# Patient Record
Sex: MALE | URBAN - METROPOLITAN AREA
[De-identification: names, ages, dates, MRNs, and addresses within clinical notes are randomized per-mention and may not be internally consistent; named-entity substitution may affect disease eponyms.]

---

## 2017-01-06 ENCOUNTER — HISTORICAL (OUTPATIENT)
Dept: LAB | Facility: HOSPITAL | Age: 52
End: 2017-01-06

## 2017-01-12 ENCOUNTER — HISTORICAL (OUTPATIENT)
Dept: RADIOLOGY | Facility: HOSPITAL | Age: 52
End: 2017-01-12

## 2017-02-06 ENCOUNTER — HISTORICAL (OUTPATIENT)
Dept: RADIOLOGY | Facility: HOSPITAL | Age: 52
End: 2017-02-06

## 2017-05-02 ENCOUNTER — HISTORICAL (OUTPATIENT)
Dept: LAB | Facility: HOSPITAL | Age: 52
End: 2017-05-02

## 2017-05-02 LAB
ABS NEUT (OLG): 5 X10(3)/MCL (ref 1.5–6.9)
ALBUMIN SERPL-MCNC: 4.2 GM/DL (ref 3.4–5)
ALBUMIN/GLOB SERPL: 1 RATIO
ALP SERPL-CCNC: 81 UNIT/L (ref 30–113)
ALT SERPL-CCNC: 22 UNIT/L (ref 10–45)
APPEARANCE, UA: CLEAR
AST SERPL-CCNC: 19 UNIT/L (ref 15–37)
BASOPHILS # BLD AUTO: 0 X10(3)/MCL (ref 0–0.1)
BASOPHILS NFR BLD AUTO: 0 % (ref 0–1)
BILIRUB SERPL-MCNC: 0.6 MG/DL (ref 0.1–0.9)
BILIRUB UR QL STRIP: NEGATIVE
BILIRUBIN DIRECT+TOT PNL SERPL-MCNC: 0.2 MG/DL (ref 0–0.3)
BILIRUBIN DIRECT+TOT PNL SERPL-MCNC: 0.4 MG/DL
BUN SERPL-MCNC: 7 MG/DL (ref 10–20)
CALCIUM SERPL-MCNC: 9 MG/DL (ref 8–10.5)
CHLORIDE SERPL-SCNC: 103 MMOL/L (ref 100–108)
CHOLEST SERPL-MCNC: 155 MG/DL (ref 140–200)
CHOLEST/HDLC SERPL: 3 MG/DL (ref 35–59)
CO2 SERPL-SCNC: 30 MMOL/L (ref 21–35)
COLOR UR: NORMAL
CREAT SERPL-MCNC: 0.61 MG/DL (ref 0.7–1.3)
DEPRECATED CALCIDIOL+CALCIFEROL SERPL-MC: 29.9 NG/ML (ref 30–80)
EOSINOPHIL # BLD AUTO: 0.2 X10(3)/MCL (ref 0–0.6)
EOSINOPHIL NFR BLD AUTO: 3 % (ref 0–5)
ERYTHROCYTE [DISTWIDTH] IN BLOOD BY AUTOMATED COUNT: 13.5 % (ref 11.5–17)
GLOBULIN SER-MCNC: 4 GM/DL
GLUCOSE (UA): NEGATIVE
GLUCOSE SERPL-MCNC: 90 MG/DL (ref 75–116)
HCT VFR BLD AUTO: 49.2 % (ref 42–52)
HDLC SERPL-MCNC: 49 MG/DL (ref 35–59)
HGB BLD-MCNC: 16.3 GM/DL (ref 14–18)
HGB UR QL STRIP: NEGATIVE
KETONES UR QL STRIP: NEGATIVE
LDLC SERPL CALC-MCNC: 95 MG/DL (ref 100–129)
LEUKOCYTE ESTERASE UR QL STRIP: NEGATIVE
LYMPHOCYTES # BLD AUTO: 2.7 X10(3)/MCL (ref 0.5–4.1)
LYMPHOCYTES NFR BLD AUTO: 30.6 % (ref 15–40)
MCH RBC QN AUTO: 32 PG (ref 27–34)
MCHC RBC AUTO-ENTMCNC: 33 GM/DL (ref 31–36)
MCV RBC AUTO: 97 FL (ref 80–99)
MONOCYTES # BLD AUTO: 0.8 X10(3)/MCL (ref 0–1.1)
MONOCYTES NFR BLD AUTO: 9 % (ref 4–12)
NEUTROPHILS # BLD AUTO: 5 X10(3)/MCL (ref 1.5–6.9)
NEUTROPHILS NFR BLD AUTO: 57 % (ref 43–75)
NITRITE UR QL STRIP: NEGATIVE
PH UR STRIP: 7 [PH]
PLATELET # BLD AUTO: 303 X10(3)/MCL (ref 140–400)
PMV BLD AUTO: 10.7 FL (ref 6.8–10)
POTASSIUM SERPL-SCNC: 4 MMOL/L (ref 3.6–5.2)
PROT SERPL-MCNC: 8.2 GM/DL (ref 6.4–8.2)
PROT UR QL STRIP: NEGATIVE
PSA SERPL-MCNC: 1.35 NG/ML (ref 0–4)
RBC # BLD AUTO: 5.07 X10(6)/MCL (ref 4.7–6.1)
SODIUM SERPL-SCNC: 140 MMOL/L (ref 135–145)
SP GR UR STRIP: 1.01
TRIGL SERPL-MCNC: 63 MG/DL (ref 35–150)
TSH SERPL-ACNC: 0.87 MIU/ML (ref 0.36–3.74)
UROBILINOGEN UR STRIP-ACNC: 0.2 EU/DL
VLDLC SERPL CALC-MCNC: 13 MG/DL
WBC # SPEC AUTO: 8.8 X10(3)/MCL (ref 4.5–11.5)

## 2017-05-31 ENCOUNTER — HISTORICAL (OUTPATIENT)
Dept: RADIOLOGY | Facility: HOSPITAL | Age: 52
End: 2017-05-31

## 2017-07-06 ENCOUNTER — HISTORICAL (OUTPATIENT)
Dept: WOUND CARE | Facility: HOSPITAL | Age: 52
End: 2017-07-06

## 2017-07-12 LAB — FINAL CULTURE: NORMAL

## 2017-07-13 ENCOUNTER — HISTORICAL (OUTPATIENT)
Dept: WOUND CARE | Facility: HOSPITAL | Age: 52
End: 2017-07-13

## 2017-07-17 ENCOUNTER — HISTORICAL (OUTPATIENT)
Dept: RADIOLOGY | Facility: HOSPITAL | Age: 52
End: 2017-07-17

## 2017-07-20 ENCOUNTER — HISTORICAL (OUTPATIENT)
Dept: WOUND CARE | Facility: HOSPITAL | Age: 52
End: 2017-07-20

## 2017-07-27 ENCOUNTER — HISTORICAL (OUTPATIENT)
Dept: WOUND CARE | Facility: HOSPITAL | Age: 52
End: 2017-07-27

## 2017-08-10 ENCOUNTER — HISTORICAL (OUTPATIENT)
Dept: WOUND CARE | Facility: HOSPITAL | Age: 52
End: 2017-08-10

## 2017-08-17 ENCOUNTER — HISTORICAL (OUTPATIENT)
Dept: WOUND CARE | Facility: HOSPITAL | Age: 52
End: 2017-08-17

## 2017-08-23 ENCOUNTER — HISTORICAL (OUTPATIENT)
Dept: RADIOLOGY | Facility: HOSPITAL | Age: 52
End: 2017-08-23

## 2017-08-24 ENCOUNTER — HISTORICAL (OUTPATIENT)
Dept: WOUND CARE | Facility: HOSPITAL | Age: 52
End: 2017-08-24

## 2017-08-28 ENCOUNTER — HISTORICAL (OUTPATIENT)
Dept: RADIOLOGY | Facility: HOSPITAL | Age: 52
End: 2017-08-28

## 2017-08-31 ENCOUNTER — HISTORICAL (OUTPATIENT)
Dept: WOUND CARE | Facility: HOSPITAL | Age: 52
End: 2017-08-31

## 2017-09-07 ENCOUNTER — HISTORICAL (OUTPATIENT)
Dept: WOUND CARE | Facility: HOSPITAL | Age: 52
End: 2017-09-07

## 2017-09-08 ENCOUNTER — HISTORICAL (OUTPATIENT)
Dept: LAB | Facility: HOSPITAL | Age: 52
End: 2017-09-08

## 2017-09-08 LAB
ABS NEUT (OLG): 0.6 X10(3)/MCL (ref 1.5–6.9)
ALBUMIN SERPL-MCNC: 4 GM/DL (ref 3.4–5)
ALBUMIN/GLOB SERPL: 1 RATIO
ALP SERPL-CCNC: 94 UNIT/L (ref 30–113)
ALT SERPL-CCNC: 17 UNIT/L (ref 10–45)
APPEARANCE, UA: CLEAR
AST SERPL-CCNC: 13 UNIT/L (ref 15–37)
BASOPHILS NFR BLD MANUAL: 0 % (ref 0–1)
BILIRUB SERPL-MCNC: 0.7 MG/DL (ref 0.1–0.9)
BILIRUB UR QL STRIP: NEGATIVE
BILIRUBIN DIRECT+TOT PNL SERPL-MCNC: 0.2 MG/DL (ref 0–0.3)
BILIRUBIN DIRECT+TOT PNL SERPL-MCNC: 0.5 MG/DL
BUN SERPL-MCNC: 4 MG/DL (ref 10–20)
CALCIUM SERPL-MCNC: 8.7 MG/DL (ref 8–10.5)
CHLORIDE SERPL-SCNC: 102 MMOL/L (ref 100–108)
CHOLEST SERPL-MCNC: 151 MG/DL (ref 140–200)
CHOLEST/HDLC SERPL: 5 MG/DL (ref 0–8)
CO2 SERPL-SCNC: 32 MMOL/L (ref 21–35)
COLOR UR: NORMAL
CREAT SERPL-MCNC: 0.7 MG/DL (ref 0.7–1.3)
CREAT UR-MCNC: 42.7 MG/DL
DEPRECATED CALCIDIOL+CALCIFEROL SERPL-MC: 18.98 NG/ML (ref 30–80)
EOSINOPHIL NFR BLD MANUAL: 11 % (ref 0–5)
ERYTHROCYTE [DISTWIDTH] IN BLOOD BY AUTOMATED COUNT: 13 % (ref 11.5–17)
GLOBULIN SER-MCNC: 4 GM/DL
GLUCOSE (UA): NEGATIVE
GLUCOSE SERPL-MCNC: 89 MG/DL (ref 75–116)
GRANULOCYTES NFR BLD MANUAL: 13 % (ref 47–80)
HCT VFR BLD AUTO: 44.3 % (ref 42–52)
HDLC SERPL-MCNC: 28 MG/DL (ref 35–59)
HGB BLD-MCNC: 14.8 GM/DL (ref 14–18)
HGB UR QL STRIP: NEGATIVE
KETONES UR QL STRIP: NEGATIVE
LDLC SERPL CALC-MCNC: 104 MG/DL (ref 100–129)
LEUKOCYTE ESTERASE UR QL STRIP: NEGATIVE
LYMPHOCYTES NFR BLD MANUAL: 49 % (ref 15–40)
MCH RBC QN AUTO: 31 PG (ref 27–34)
MCHC RBC AUTO-ENTMCNC: 33 GM/DL (ref 31–36)
MCV RBC AUTO: 93 FL (ref 80–99)
MICROALBUMIN UR-MCNC: 4.1 MG/DL (ref 0.1–2)
MICROALBUMIN/CREAT RATIO PNL UR: 96 MG/GM CR (ref 0–30)
MONOCYTES NFR BLD MANUAL: 27 % (ref 4–12)
NITRITE UR QL STRIP: NEGATIVE
PH UR STRIP: 7.5 [PH]
PLATELET # BLD AUTO: 342 X10(3)/MCL (ref 140–400)
PLATELET # BLD EST: ADEQUATE 10*3/UL
PMV BLD AUTO: 10.1 FL (ref 6.8–10)
POTASSIUM SERPL-SCNC: 3.8 MMOL/L (ref 3.6–5.2)
PROT SERPL-MCNC: 8 GM/DL (ref 6.4–8.2)
PROT UR QL STRIP: NEGATIVE
RBC # BLD AUTO: 4.75 X10(6)/MCL (ref 4.7–6.1)
RBC MORPH BLD: NORMAL
SODIUM SERPL-SCNC: 140 MMOL/L (ref 135–145)
SP GR UR STRIP: 1.01
T4 SERPL-MCNC: 8.7 MCG/DL (ref 5.3–11.5)
TRIGL SERPL-MCNC: 134 MG/DL (ref 35–150)
TSH SERPL-ACNC: 1.04 MIU/ML (ref 0.36–3.74)
UROBILINOGEN UR STRIP-ACNC: 0.2 EU/DL
VLDLC SERPL CALC-MCNC: 27 MG/DL
WBC # SPEC AUTO: 4.3 X10(3)/MCL (ref 4.5–11.5)

## 2017-09-14 ENCOUNTER — HISTORICAL (OUTPATIENT)
Dept: WOUND CARE | Facility: HOSPITAL | Age: 52
End: 2017-09-14

## 2017-09-20 ENCOUNTER — HISTORICAL (OUTPATIENT)
Dept: RADIOLOGY | Facility: HOSPITAL | Age: 52
End: 2017-09-20

## 2017-09-21 ENCOUNTER — HISTORICAL (OUTPATIENT)
Dept: WOUND CARE | Facility: HOSPITAL | Age: 52
End: 2017-09-21

## 2017-10-05 ENCOUNTER — HISTORICAL (OUTPATIENT)
Dept: WOUND CARE | Facility: HOSPITAL | Age: 52
End: 2017-10-05

## 2017-10-09 ENCOUNTER — HISTORICAL (OUTPATIENT)
Dept: WOUND CARE | Facility: HOSPITAL | Age: 52
End: 2017-10-09

## 2017-10-11 ENCOUNTER — HISTORICAL (OUTPATIENT)
Dept: SURGERY | Facility: HOSPITAL | Age: 52
End: 2017-10-11

## 2017-10-13 ENCOUNTER — HISTORICAL (OUTPATIENT)
Dept: ANESTHESIOLOGY | Facility: HOSPITAL | Age: 52
End: 2017-10-13

## 2017-10-19 ENCOUNTER — HISTORICAL (OUTPATIENT)
Dept: WOUND CARE | Facility: HOSPITAL | Age: 52
End: 2017-10-19

## 2017-10-23 ENCOUNTER — HOSPITAL ENCOUNTER (OUTPATIENT)
Dept: MEDSURG UNIT | Facility: HOSPITAL | Age: 52
End: 2017-10-26
Attending: SURGERY | Admitting: SURGERY

## 2017-10-23 LAB
ABS NEUT (OLG): 8.5 X10(3)/MCL (ref 1.5–6.9)
ALBUMIN SERPL-MCNC: 3.4 GM/DL (ref 3.4–5)
ALBUMIN/GLOB SERPL: 0.8 RATIO
ALP SERPL-CCNC: 96 UNIT/L (ref 30–113)
ALT SERPL-CCNC: 25 UNIT/L (ref 10–45)
ANISOCYTOSIS BLD QL SMEAR: ABNORMAL
AST SERPL-CCNC: 18 UNIT/L (ref 15–37)
BASOPHILS NFR BLD MANUAL: 0 % (ref 0–1)
BILIRUB SERPL-MCNC: 0.3 MG/DL (ref 0.1–0.9)
BILIRUBIN DIRECT+TOT PNL SERPL-MCNC: 0.1 MG/DL (ref 0–0.3)
BILIRUBIN DIRECT+TOT PNL SERPL-MCNC: 0.2 MG/DL
BUN SERPL-MCNC: 6 MG/DL (ref 10–20)
BUN SERPL-MCNC: 7 MG/DL (ref 10–20)
CALCIUM SERPL-MCNC: 8.9 MG/DL (ref 8–10.5)
CALCIUM SERPL-MCNC: 9 MG/DL (ref 8–10.5)
CHLORIDE SERPL-SCNC: 101 MMOL/L (ref 100–108)
CHLORIDE SERPL-SCNC: 102 MMOL/L (ref 100–108)
CO2 SERPL-SCNC: 30 MMOL/L (ref 21–35)
CO2 SERPL-SCNC: 31 MMOL/L (ref 21–35)
CREAT SERPL-MCNC: 0.76 MG/DL (ref 0.7–1.3)
CREAT SERPL-MCNC: 0.9 MG/DL (ref 0.7–1.3)
EOSINOPHIL NFR BLD MANUAL: 4 % (ref 0–5)
ERYTHROCYTE [DISTWIDTH] IN BLOOD BY AUTOMATED COUNT: 14.5 % (ref 11.5–17)
GLOBULIN SER-MCNC: 4.4 GM/DL
GLUCOSE SERPL-MCNC: 133 MG/DL (ref 75–116)
GLUCOSE SERPL-MCNC: 94 MG/DL (ref 75–116)
GRANULOCYTES NFR BLD MANUAL: 64 % (ref 47–80)
HCT VFR BLD AUTO: 43.5 % (ref 42–52)
HGB BLD-MCNC: 14.4 GM/DL (ref 14–18)
HYPOCHROMIA BLD QL SMEAR: ABNORMAL
LYMPHOCYTES NFR BLD MANUAL: 27 % (ref 15–40)
MCH RBC QN AUTO: 31 PG (ref 27–34)
MCHC RBC AUTO-ENTMCNC: 33 GM/DL (ref 31–36)
MCV RBC AUTO: 93 FL (ref 80–99)
MONOCYTES NFR BLD MANUAL: 5 % (ref 4–12)
PLATELET # BLD AUTO: 423 X10(3)/MCL (ref 140–400)
PLATELET # BLD EST: ADEQUATE 10*3/UL
PMV BLD AUTO: 9.4 FL (ref 6.8–10)
POTASSIUM SERPL-SCNC: 3.2 MMOL/L (ref 3.6–5.2)
POTASSIUM SERPL-SCNC: 3.4 MMOL/L (ref 3.6–5.2)
PROT SERPL-MCNC: 7.8 GM/DL (ref 6.4–8.2)
RBC # BLD AUTO: 4.68 X10(6)/MCL (ref 4.7–6.1)
RBC MORPH BLD: ABNORMAL
SODIUM SERPL-SCNC: 138 MMOL/L (ref 135–145)
SODIUM SERPL-SCNC: 140 MMOL/L (ref 135–145)
WBC # SPEC AUTO: 13.3 X10(3)/MCL (ref 4.5–11.5)

## 2017-10-24 LAB
ABS NEUT (OLG): 7.5 X10(3)/MCL (ref 1.5–6.9)
BASOPHILS # BLD AUTO: 0.1 X10(3)/MCL (ref 0–0.1)
BASOPHILS NFR BLD AUTO: 0 % (ref 0–1)
BUN SERPL-MCNC: 5 MG/DL (ref 10–20)
CALCIUM SERPL-MCNC: 8.8 MG/DL (ref 8–10.5)
CHLORIDE SERPL-SCNC: 100 MMOL/L (ref 100–108)
CO2 SERPL-SCNC: 31 MMOL/L (ref 21–35)
CREAT SERPL-MCNC: 0.86 MG/DL (ref 0.7–1.3)
EOSINOPHIL # BLD AUTO: 0.3 X10(3)/MCL (ref 0–0.6)
EOSINOPHIL NFR BLD AUTO: 2 % (ref 0–5)
ERYTHROCYTE [DISTWIDTH] IN BLOOD BY AUTOMATED COUNT: 14.4 % (ref 11.5–17)
GLUCOSE SERPL-MCNC: 107 MG/DL (ref 75–116)
HCT VFR BLD AUTO: 43.3 % (ref 42–52)
HGB BLD-MCNC: 14.2 GM/DL (ref 14–18)
LYMPHOCYTES # BLD AUTO: 2.5 X10(3)/MCL (ref 0.5–4.1)
LYMPHOCYTES NFR BLD AUTO: 22.4 % (ref 15–40)
MCH RBC QN AUTO: 30 PG (ref 27–34)
MCHC RBC AUTO-ENTMCNC: 33 GM/DL (ref 31–36)
MCV RBC AUTO: 93 FL (ref 80–99)
MONOCYTES # BLD AUTO: 0.9 X10(3)/MCL (ref 0–1.1)
MONOCYTES NFR BLD AUTO: 8 % (ref 4–12)
NEUTROPHILS # BLD AUTO: 7.5 X10(3)/MCL (ref 1.5–6.9)
NEUTROPHILS NFR BLD AUTO: 66 % (ref 43–75)
PLATELET # BLD AUTO: 414 X10(3)/MCL (ref 140–400)
PMV BLD AUTO: 9.3 FL (ref 6.8–10)
POTASSIUM SERPL-SCNC: 4 MMOL/L (ref 3.6–5.2)
RBC # BLD AUTO: 4.65 X10(6)/MCL (ref 4.7–6.1)
SODIUM SERPL-SCNC: 138 MMOL/L (ref 135–145)
WBC # SPEC AUTO: 11.3 X10(3)/MCL (ref 4.5–11.5)

## 2017-10-25 LAB
ABS NEUT (OLG): 7 X10(3)/MCL (ref 1.5–6.9)
ALBUMIN SERPL-MCNC: 3.3 GM/DL (ref 3.4–5)
ALBUMIN/GLOB SERPL: 0.8 RATIO
ALP SERPL-CCNC: 87 UNIT/L (ref 30–113)
ALT SERPL-CCNC: 21 UNIT/L (ref 10–45)
AST SERPL-CCNC: 16 UNIT/L (ref 15–37)
BASOPHILS # BLD AUTO: 0 X10(3)/MCL (ref 0–0.1)
BASOPHILS NFR BLD AUTO: 0 % (ref 0–1)
BILIRUB SERPL-MCNC: 0.5 MG/DL (ref 0.1–0.9)
BILIRUBIN DIRECT+TOT PNL SERPL-MCNC: 0.1 MG/DL (ref 0–0.3)
BILIRUBIN DIRECT+TOT PNL SERPL-MCNC: 0.4 MG/DL
BUN SERPL-MCNC: 7 MG/DL (ref 10–20)
CALCIUM SERPL-MCNC: 9.1 MG/DL (ref 8–10.5)
CHLORIDE SERPL-SCNC: 103 MMOL/L (ref 100–108)
CO2 SERPL-SCNC: 30 MMOL/L (ref 21–35)
CREAT SERPL-MCNC: 0.74 MG/DL (ref 0.7–1.3)
EOSINOPHIL # BLD AUTO: 0.3 X10(3)/MCL (ref 0–0.6)
EOSINOPHIL NFR BLD AUTO: 3 % (ref 0–5)
ERYTHROCYTE [DISTWIDTH] IN BLOOD BY AUTOMATED COUNT: 14.4 % (ref 11.5–17)
GLOBULIN SER-MCNC: 4.4 GM/DL
GLUCOSE SERPL-MCNC: 108 MG/DL (ref 75–116)
HCT VFR BLD AUTO: 44.6 % (ref 42–52)
HGB BLD-MCNC: 14.4 GM/DL (ref 14–18)
LYMPHOCYTES # BLD AUTO: 2.1 X10(3)/MCL (ref 0.5–4.1)
LYMPHOCYTES NFR BLD AUTO: 20.2 % (ref 15–40)
MAGNESIUM SERPL-MCNC: 2.4 MG/DL (ref 1.8–2.4)
MCH RBC QN AUTO: 30 PG (ref 27–34)
MCHC RBC AUTO-ENTMCNC: 32 GM/DL (ref 31–36)
MCV RBC AUTO: 94 FL (ref 80–99)
MONOCYTES # BLD AUTO: 0.8 X10(3)/MCL (ref 0–1.1)
MONOCYTES NFR BLD AUTO: 8 % (ref 4–12)
NEUTROPHILS # BLD AUTO: 7 X10(3)/MCL (ref 1.5–6.9)
NEUTROPHILS NFR BLD AUTO: 68 % (ref 43–75)
PHOSPHATE SERPL-MCNC: 3.9 MG/DL (ref 2.6–4.7)
PLATELET # BLD AUTO: 429 X10(3)/MCL (ref 140–400)
PMV BLD AUTO: 9.5 FL (ref 6.8–10)
POTASSIUM SERPL-SCNC: 4 MMOL/L (ref 3.6–5.2)
PROT SERPL-MCNC: 7.7 GM/DL (ref 6.4–8.2)
RBC # BLD AUTO: 4.76 X10(6)/MCL (ref 4.7–6.1)
SODIUM SERPL-SCNC: 140 MMOL/L (ref 135–145)
WBC # SPEC AUTO: 10.3 X10(3)/MCL (ref 4.5–11.5)

## 2017-10-26 ENCOUNTER — HISTORICAL (OUTPATIENT)
Dept: WOUND CARE | Facility: HOSPITAL | Age: 52
End: 2017-10-26

## 2017-10-26 LAB
ABS NEUT (OLG): 7.4 X10(3)/MCL (ref 1.5–6.9)
ALBUMIN SERPL-MCNC: 3.2 GM/DL (ref 3.4–5)
ALBUMIN/GLOB SERPL: 0.7 RATIO
ALP SERPL-CCNC: 77 UNIT/L (ref 30–113)
ALT SERPL-CCNC: 23 UNIT/L (ref 10–45)
AST SERPL-CCNC: 30 UNIT/L (ref 15–37)
BASOPHILS # BLD AUTO: 0 X10(3)/MCL (ref 0–0.1)
BASOPHILS NFR BLD AUTO: 0 % (ref 0–1)
BILIRUB SERPL-MCNC: 0.4 MG/DL (ref 0.1–0.9)
BILIRUBIN DIRECT+TOT PNL SERPL-MCNC: 0.1 MG/DL (ref 0–0.3)
BILIRUBIN DIRECT+TOT PNL SERPL-MCNC: 0.3 MG/DL
BUN SERPL-MCNC: 9 MG/DL (ref 10–20)
CALCIUM SERPL-MCNC: 9.3 MG/DL (ref 8–10.5)
CHLORIDE SERPL-SCNC: 102 MMOL/L (ref 100–108)
CO2 SERPL-SCNC: 30 MMOL/L (ref 21–35)
CREAT SERPL-MCNC: 0.69 MG/DL (ref 0.7–1.3)
EOSINOPHIL # BLD AUTO: 0.3 X10(3)/MCL (ref 0–0.6)
EOSINOPHIL NFR BLD AUTO: 3 % (ref 0–5)
ERYTHROCYTE [DISTWIDTH] IN BLOOD BY AUTOMATED COUNT: 14.7 % (ref 11.5–17)
GLOBULIN SER-MCNC: 4.4 GM/DL
GLUCOSE SERPL-MCNC: 100 MG/DL (ref 75–116)
HCT VFR BLD AUTO: 44 % (ref 42–52)
HGB BLD-MCNC: 14.1 GM/DL (ref 14–18)
LYMPHOCYTES # BLD AUTO: 2.1 X10(3)/MCL (ref 0.5–4.1)
LYMPHOCYTES NFR BLD AUTO: 19.4 % (ref 15–40)
MAGNESIUM SERPL-MCNC: 2.4 MG/DL (ref 1.8–2.4)
MCH RBC QN AUTO: 30 PG (ref 27–34)
MCHC RBC AUTO-ENTMCNC: 32 GM/DL (ref 31–36)
MCV RBC AUTO: 94 FL (ref 80–99)
MONOCYTES # BLD AUTO: 0.9 X10(3)/MCL (ref 0–1.1)
MONOCYTES NFR BLD AUTO: 8 % (ref 4–12)
NEUTROPHILS # BLD AUTO: 7.4 X10(3)/MCL (ref 1.5–6.9)
NEUTROPHILS NFR BLD AUTO: 69 % (ref 43–75)
PHOSPHATE SERPL-MCNC: 4.2 MG/DL (ref 2.6–4.7)
PLATELET # BLD AUTO: 448 X10(3)/MCL (ref 140–400)
PMV BLD AUTO: 9.3 FL (ref 6.8–10)
POTASSIUM SERPL-SCNC: 4.4 MMOL/L (ref 3.6–5.2)
PROT SERPL-MCNC: 7.6 GM/DL (ref 6.4–8.2)
RBC # BLD AUTO: 4.67 X10(6)/MCL (ref 4.7–6.1)
SODIUM SERPL-SCNC: 140 MMOL/L (ref 135–145)
WBC # SPEC AUTO: 10.8 X10(3)/MCL (ref 4.5–11.5)

## 2017-10-31 ENCOUNTER — HISTORICAL (OUTPATIENT)
Dept: WOUND CARE | Facility: HOSPITAL | Age: 52
End: 2017-10-31

## 2017-11-07 ENCOUNTER — HISTORICAL (OUTPATIENT)
Dept: WOUND CARE | Facility: HOSPITAL | Age: 52
End: 2017-11-07

## 2017-11-08 ENCOUNTER — HISTORICAL (OUTPATIENT)
Dept: INFUSION THERAPY | Facility: HOSPITAL | Age: 52
End: 2017-11-08

## 2017-11-08 LAB
ALBUMIN SERPL-MCNC: 4 GM/DL (ref 3.4–5)
ALBUMIN/GLOB SERPL: 0.9 RATIO
ALP SERPL-CCNC: 95 UNIT/L (ref 30–113)
ALT SERPL-CCNC: 18 UNIT/L (ref 10–45)
AST SERPL-CCNC: 18 UNIT/L (ref 15–37)
BILIRUB SERPL-MCNC: 0.4 MG/DL (ref 0.1–0.9)
BILIRUBIN DIRECT+TOT PNL SERPL-MCNC: 0.1 MG/DL (ref 0–0.3)
BILIRUBIN DIRECT+TOT PNL SERPL-MCNC: 0.3 MG/DL
BUN SERPL-MCNC: 6 MG/DL (ref 10–20)
CALCIUM SERPL-MCNC: 9.3 MG/DL (ref 8–10.5)
CHLORIDE SERPL-SCNC: 101 MMOL/L (ref 100–108)
CO2 SERPL-SCNC: 32 MMOL/L (ref 21–35)
CREAT SERPL-MCNC: 0.8 MG/DL (ref 0.7–1.3)
GLOBULIN SER-MCNC: 4.5 GM/DL
GLUCOSE SERPL-MCNC: 94 MG/DL (ref 75–116)
POTASSIUM SERPL-SCNC: 3.8 MMOL/L (ref 3.6–5.2)
PROT SERPL-MCNC: 8.5 GM/DL (ref 6.4–8.2)
SODIUM SERPL-SCNC: 138 MMOL/L (ref 135–145)

## 2017-11-16 ENCOUNTER — HISTORICAL (OUTPATIENT)
Dept: WOUND CARE | Facility: HOSPITAL | Age: 52
End: 2017-11-16

## 2017-11-29 ENCOUNTER — HISTORICAL (OUTPATIENT)
Dept: INFUSION THERAPY | Facility: HOSPITAL | Age: 52
End: 2017-11-29

## 2017-11-29 LAB
ABS NEUT (OLG): 5.8 X10(3)/MCL (ref 1.5–6.9)
BASOPHILS # BLD AUTO: 0 X10(3)/MCL (ref 0–0.1)
BASOPHILS NFR BLD AUTO: 0 % (ref 0–1)
EOSINOPHIL # BLD AUTO: 0.3 X10(3)/MCL (ref 0–0.6)
EOSINOPHIL NFR BLD AUTO: 3 % (ref 0–5)
ERYTHROCYTE [DISTWIDTH] IN BLOOD BY AUTOMATED COUNT: 15.4 % (ref 11.5–17)
HCT VFR BLD AUTO: 46.9 % (ref 42–52)
HGB BLD-MCNC: 15.8 GM/DL (ref 14–18)
LDH SERPL-CCNC: 150 UNIT/L (ref 105–241)
LYMPHOCYTES # BLD AUTO: 2.4 X10(3)/MCL (ref 0.5–4.1)
LYMPHOCYTES NFR BLD AUTO: 26.3 % (ref 15–40)
MCH RBC QN AUTO: 31 PG (ref 27–34)
MCHC RBC AUTO-ENTMCNC: 34 GM/DL (ref 31–36)
MCV RBC AUTO: 93 FL (ref 80–99)
MONOCYTES # BLD AUTO: 0.6 X10(3)/MCL (ref 0–1.1)
MONOCYTES NFR BLD AUTO: 6 % (ref 4–12)
NEUTROPHILS # BLD AUTO: 5.8 X10(3)/MCL (ref 1.5–6.9)
NEUTROPHILS NFR BLD AUTO: 64 % (ref 43–75)
PLATELET # BLD AUTO: 308 X10(3)/MCL (ref 140–400)
PMV BLD AUTO: 10 FL (ref 6.8–10)
PROT SERPL-MCNC: 7.8 GM/DL
RBC # BLD AUTO: 5.06 X10(6)/MCL (ref 4.7–6.1)
WBC # SPEC AUTO: 9.2 X10(3)/MCL (ref 4.5–11.5)

## 2017-12-13 ENCOUNTER — HISTORICAL (OUTPATIENT)
Dept: LAB | Facility: HOSPITAL | Age: 52
End: 2017-12-13

## 2017-12-13 LAB
ABS NEUT (OLG): 4.8 X10(3)/MCL (ref 1.5–6.9)
ALBUMIN SERPL-MCNC: 3.8 GM/DL (ref 3.4–5)
ALBUMIN/GLOB SERPL: 0.9 RATIO
ALP SERPL-CCNC: 88 UNIT/L (ref 30–113)
ALT SERPL-CCNC: 19 UNIT/L (ref 10–45)
APPEARANCE, UA: CLEAR
AST SERPL-CCNC: 17 UNIT/L (ref 15–37)
BASOPHILS # BLD AUTO: 0 X10(3)/MCL (ref 0–0.1)
BASOPHILS NFR BLD AUTO: 0 % (ref 0–1)
BILIRUB SERPL-MCNC: 0.5 MG/DL (ref 0.1–0.9)
BILIRUB UR QL STRIP: NEGATIVE
BILIRUBIN DIRECT+TOT PNL SERPL-MCNC: 0.1 MG/DL (ref 0–0.3)
BILIRUBIN DIRECT+TOT PNL SERPL-MCNC: 0.4 MG/DL
BUN SERPL-MCNC: 10 MG/DL (ref 10–20)
CALCIUM SERPL-MCNC: 8.7 MG/DL (ref 8–10.5)
CHLORIDE SERPL-SCNC: 104 MMOL/L (ref 100–108)
CHOLEST SERPL-MCNC: 141 MG/DL (ref 140–200)
CHOLEST/HDLC SERPL: 4 MG/DL (ref 0–8)
CO2 SERPL-SCNC: 27 MMOL/L (ref 21–35)
COLOR UR: NORMAL
CREAT SERPL-MCNC: 0.82 MG/DL (ref 0.7–1.3)
CREAT UR-MCNC: 24.8 MG/DL
DEPRECATED CALCIDIOL+CALCIFEROL SERPL-MC: 15.27 NG/ML (ref 30–80)
EOSINOPHIL # BLD AUTO: 0.5 X10(3)/MCL (ref 0–0.6)
EOSINOPHIL NFR BLD AUTO: 6 % (ref 0–5)
ERYTHROCYTE [DISTWIDTH] IN BLOOD BY AUTOMATED COUNT: 15.3 % (ref 11.5–17)
EST. AVERAGE GLUCOSE BLD GHB EST-MCNC: 100 MG/DL
GLOBULIN SER-MCNC: 4.3 GM/DL
GLUCOSE (UA): NEGATIVE
GLUCOSE SERPL-MCNC: 116 MG/DL (ref 75–116)
HBA1C MFR BLD: 5.1 % (ref 4.8–6)
HCT VFR BLD AUTO: 45.6 % (ref 42–52)
HDLC SERPL-MCNC: 35 MG/DL (ref 35–59)
HGB BLD-MCNC: 15.1 GM/DL (ref 14–18)
HGB UR QL STRIP: NEGATIVE
KETONES UR QL STRIP: NEGATIVE
LDLC SERPL CALC-MCNC: 89 MG/DL (ref 100–129)
LEUKOCYTE ESTERASE UR QL STRIP: NEGATIVE
LYMPHOCYTES # BLD AUTO: 2.4 X10(3)/MCL (ref 0.5–4.1)
LYMPHOCYTES NFR BLD AUTO: 28.4 % (ref 15–40)
MCH RBC QN AUTO: 31 PG (ref 27–34)
MCHC RBC AUTO-ENTMCNC: 33 GM/DL (ref 31–36)
MCV RBC AUTO: 95 FL (ref 80–99)
MICROALBUMIN UR-MCNC: 7.9 MG/DL (ref 0.1–2)
MICROALBUMIN/CREAT RATIO PNL UR: 318.2 MG/GM CR (ref 0–30)
MONOCYTES # BLD AUTO: 0.7 X10(3)/MCL (ref 0–1.1)
MONOCYTES NFR BLD AUTO: 8 % (ref 4–12)
NEUTROPHILS # BLD AUTO: 4.8 X10(3)/MCL (ref 1.5–6.9)
NEUTROPHILS NFR BLD AUTO: 56 % (ref 43–75)
NITRITE UR QL STRIP: NEGATIVE
PH UR STRIP: 7 [PH]
PLATELET # BLD AUTO: 349 X10(3)/MCL (ref 140–400)
PMV BLD AUTO: 10.3 FL (ref 6.8–10)
POTASSIUM SERPL-SCNC: 3.8 MMOL/L (ref 3.6–5.2)
PROT SERPL-MCNC: 8.1 GM/DL (ref 6.4–8.2)
PROT UR QL STRIP: NEGATIVE
PSA SERPL-MCNC: 1.25 NG/ML (ref 0–4)
RBC # BLD AUTO: 4.82 X10(6)/MCL (ref 4.7–6.1)
SODIUM SERPL-SCNC: 141 MMOL/L (ref 135–145)
SP GR UR STRIP: 1.01
T3FREE SERPL-MCNC: 2.99 PG/ML (ref 2.18–3.98)
T4 SERPL-MCNC: 5.6 MCG/DL (ref 5.3–11.5)
TESTOST SERPL-MCNC: 367.9 NG/DL (ref 241–827)
TRIGL SERPL-MCNC: 169 MG/DL (ref 35–150)
TSH SERPL-ACNC: 0.92 MIU/ML (ref 0.36–3.74)
UROBILINOGEN UR STRIP-ACNC: 0.2 EU/DL
VIT B12 SERPL-MCNC: 295 PG/ML (ref 193–986)
VLDLC SERPL CALC-MCNC: 34 MG/DL
WBC # SPEC AUTO: 8.6 X10(3)/MCL (ref 4.5–11.5)

## 2018-03-06 ENCOUNTER — HISTORICAL (OUTPATIENT)
Dept: LAB | Facility: HOSPITAL | Age: 53
End: 2018-03-06

## 2018-03-06 LAB
ABS NEUT (OLG): 5.1 X10(3)/MCL (ref 1.5–6.9)
ALBUMIN SERPL-MCNC: 3.7 GM/DL (ref 3.4–5)
ALBUMIN/GLOB SERPL: 1 RATIO
ALP SERPL-CCNC: 76 UNIT/L (ref 30–113)
ALT SERPL-CCNC: 18 UNIT/L (ref 10–45)
APPEARANCE, UA: CLEAR
AST SERPL-CCNC: 12 UNIT/L (ref 15–37)
BACTERIA SPEC CULT: NORMAL /HPF
BASOPHILS # BLD AUTO: 0 X10(3)/MCL (ref 0–0.1)
BASOPHILS NFR BLD AUTO: 0 % (ref 0–1)
BILIRUB SERPL-MCNC: 0.5 MG/DL (ref 0.1–0.9)
BILIRUB UR QL STRIP: NEGATIVE
BILIRUBIN DIRECT+TOT PNL SERPL-MCNC: 0.1 MG/DL (ref 0–0.3)
BILIRUBIN DIRECT+TOT PNL SERPL-MCNC: 0.4 MG/DL
BUN SERPL-MCNC: 6 MG/DL (ref 10–20)
CALCIUM SERPL-MCNC: 8.5 MG/DL (ref 8–10.5)
CHLORIDE SERPL-SCNC: 103 MMOL/L (ref 100–108)
CHOLEST SERPL-MCNC: 139 MG/DL (ref 140–200)
CHOLEST/HDLC SERPL: 4 MG/DL (ref 0–8)
CO2 SERPL-SCNC: 27 MMOL/L (ref 21–35)
COLOR UR: ABNORMAL
CREAT SERPL-MCNC: 0.74 MG/DL (ref 0.7–1.3)
DEPRECATED CALCIDIOL+CALCIFEROL SERPL-MC: 29.93 NG/ML (ref 30–80)
EOSINOPHIL # BLD AUTO: 0.4 X10(3)/MCL (ref 0–0.6)
EOSINOPHIL NFR BLD AUTO: 5 % (ref 0–5)
ERYTHROCYTE [DISTWIDTH] IN BLOOD BY AUTOMATED COUNT: 13.4 % (ref 11.5–17)
EST. AVERAGE GLUCOSE BLD GHB EST-MCNC: 100 MG/DL
GLOBULIN SER-MCNC: 3.8 GM/DL
GLUCOSE (UA): NEGATIVE
GLUCOSE SERPL-MCNC: 112 MG/DL (ref 75–116)
HBA1C MFR BLD: 5.1 % (ref 4.8–6)
HCT VFR BLD AUTO: 43.2 % (ref 42–52)
HDLC SERPL-MCNC: 32 MG/DL (ref 35–59)
HGB BLD-MCNC: 14.6 GM/DL (ref 14–18)
HGB UR QL STRIP: NEGATIVE
KETONES UR QL STRIP: NEGATIVE
LDH SERPL-CCNC: 129 UNIT/L (ref 105–241)
LDLC SERPL CALC-MCNC: 87 MG/DL (ref 100–129)
LEUKOCYTE ESTERASE UR QL STRIP: NEGATIVE
LYMPHOCYTES # BLD AUTO: 2.5 X10(3)/MCL (ref 0.5–4.1)
LYMPHOCYTES NFR BLD AUTO: 28.8 % (ref 15–40)
MCH RBC QN AUTO: 33 PG (ref 27–34)
MCHC RBC AUTO-ENTMCNC: 34 GM/DL (ref 31–36)
MCV RBC AUTO: 97 FL (ref 80–99)
MONOCYTES # BLD AUTO: 0.5 X10(3)/MCL (ref 0–1.1)
MONOCYTES NFR BLD AUTO: 6 % (ref 4–12)
NEUTROPHILS # BLD AUTO: 5.1 X10(3)/MCL (ref 1.5–6.9)
NEUTROPHILS NFR BLD AUTO: 60 % (ref 43–75)
NITRITE UR QL STRIP: NEGATIVE
PH UR STRIP: 6 [PH]
PLATELET # BLD AUTO: 271 X10(3)/MCL (ref 140–400)
PMV BLD AUTO: 9.9 FL (ref 6.8–10)
POTASSIUM SERPL-SCNC: 3.5 MMOL/L (ref 3.6–5.2)
PROT SERPL-MCNC: 7.5 GM/DL (ref 6.4–8.2)
PROT UR QL STRIP: ABNORMAL
RBC # BLD AUTO: 4.45 X10(6)/MCL (ref 4.7–6.1)
RBC #/AREA URNS HPF: NORMAL /HPF
SODIUM SERPL-SCNC: 139 MMOL/L (ref 135–145)
SP GR UR STRIP: <=1.005
SQUAMOUS EPITHELIAL, UA: NORMAL /LPF
TESTOST SERPL-MCNC: 441.4 NG/DL (ref 241–827)
TRIGL SERPL-MCNC: 158 MG/DL (ref 35–150)
UROBILINOGEN UR STRIP-ACNC: 0.2 EU/DL
VIT B12 SERPL-MCNC: 1867 PG/ML (ref 193–986)
VLDLC SERPL CALC-MCNC: 32 MG/DL
WBC # SPEC AUTO: 8.5 X10(3)/MCL (ref 4.5–11.5)
WBC #/AREA URNS HPF: NORMAL /[HPF]

## 2018-07-09 ENCOUNTER — HISTORICAL (OUTPATIENT)
Dept: LAB | Facility: HOSPITAL | Age: 53
End: 2018-07-09

## 2018-07-09 LAB
ABS NEUT (OLG): 4.3 X10(3)/MCL (ref 1.5–6.9)
ALBUMIN SERPL-MCNC: 3.6 GM/DL (ref 3.4–5)
ALBUMIN/GLOB SERPL: 0.9 RATIO
ALP SERPL-CCNC: 109 UNIT/L (ref 30–113)
ALT SERPL-CCNC: 36 UNIT/L (ref 10–45)
AST SERPL-CCNC: 31 UNIT/L (ref 15–37)
BASOPHILS # BLD AUTO: 0 X10(3)/MCL (ref 0–0.1)
BASOPHILS NFR BLD AUTO: 0 % (ref 0–1)
BILIRUB SERPL-MCNC: 0.5 MG/DL (ref 0.1–0.9)
BILIRUBIN DIRECT+TOT PNL SERPL-MCNC: 0.1 MG/DL (ref 0–0.3)
BILIRUBIN DIRECT+TOT PNL SERPL-MCNC: 0.4 MG/DL
BUN SERPL-MCNC: 11 MG/DL (ref 10–20)
CALCIUM SERPL-MCNC: 8.9 MG/DL (ref 8–10.5)
CHLORIDE SERPL-SCNC: 100 MMOL/L (ref 100–108)
CO2 SERPL-SCNC: 31 MMOL/L (ref 21–35)
CREAT SERPL-MCNC: 0.76 MG/DL (ref 0.7–1.3)
EOSINOPHIL # BLD AUTO: 0.3 X10(3)/MCL (ref 0–0.6)
EOSINOPHIL NFR BLD AUTO: 3 % (ref 0–5)
ERYTHROCYTE [DISTWIDTH] IN BLOOD BY AUTOMATED COUNT: 13.3 % (ref 11.5–17)
GLOBULIN SER-MCNC: 4.2 GM/DL
GLUCOSE SERPL-MCNC: 104 MG/DL (ref 75–116)
HCT VFR BLD AUTO: 46 % (ref 42–52)
HGB BLD-MCNC: 15.2 GM/DL (ref 14–18)
LDH SERPL-CCNC: 203 UNIT/L (ref 105–241)
LYMPHOCYTES # BLD AUTO: 3.1 X10(3)/MCL (ref 0.5–4.1)
LYMPHOCYTES NFR BLD AUTO: 36.6 % (ref 15–40)
MCH RBC QN AUTO: 31 PG (ref 27–34)
MCHC RBC AUTO-ENTMCNC: 33 GM/DL (ref 31–36)
MCV RBC AUTO: 95 FL (ref 80–99)
MONOCYTES # BLD AUTO: 0.7 X10(3)/MCL (ref 0–1.1)
MONOCYTES NFR BLD AUTO: 8 % (ref 4–12)
NEUTROPHILS # BLD AUTO: 4.3 X10(3)/MCL (ref 1.5–6.9)
NEUTROPHILS NFR BLD AUTO: 51 % (ref 43–75)
PLATELET # BLD AUTO: 363 X10(3)/MCL (ref 140–400)
PMV BLD AUTO: 10.4 FL (ref 6.8–10)
POTASSIUM SERPL-SCNC: 4 MMOL/L (ref 3.6–5.2)
PROT SERPL-MCNC: 7.8 GM/DL (ref 6.4–8.2)
RBC # BLD AUTO: 4.84 X10(6)/MCL (ref 4.7–6.1)
SODIUM SERPL-SCNC: 137 MMOL/L (ref 135–145)
WBC # SPEC AUTO: 8.4 X10(3)/MCL (ref 4.5–11.5)

## 2018-11-08 ENCOUNTER — HISTORICAL (OUTPATIENT)
Dept: LAB | Facility: HOSPITAL | Age: 53
End: 2018-11-08

## 2018-11-08 LAB
ABS NEUT (OLG): 4.7 X10(3)/MCL (ref 1.5–6.9)
ALBUMIN SERPL-MCNC: 4.2 GM/DL (ref 3.4–5)
ALBUMIN/GLOB SERPL: 1 RATIO
ALP SERPL-CCNC: 86 UNIT/L (ref 30–113)
ALT SERPL-CCNC: 28 UNIT/L (ref 10–45)
AST SERPL-CCNC: 17 UNIT/L (ref 15–37)
BASOPHILS # BLD AUTO: 0.1 X10(3)/MCL (ref 0–0.1)
BASOPHILS NFR BLD AUTO: 1 % (ref 0–1)
BILIRUB SERPL-MCNC: 0.8 MG/DL (ref 0.1–0.9)
BILIRUBIN DIRECT+TOT PNL SERPL-MCNC: 0.2 MG/DL (ref 0–0.3)
BILIRUBIN DIRECT+TOT PNL SERPL-MCNC: 0.6 MG/DL
BUN SERPL-MCNC: 8 MG/DL (ref 10–20)
CALCIUM SERPL-MCNC: 8.6 MG/DL (ref 8–10.5)
CHLORIDE SERPL-SCNC: 103 MMOL/L (ref 100–108)
CO2 SERPL-SCNC: 33 MMOL/L (ref 21–35)
CREAT SERPL-MCNC: 0.79 MG/DL (ref 0.7–1.3)
EOSINOPHIL # BLD AUTO: 0.2 X10(3)/MCL (ref 0–0.6)
EOSINOPHIL NFR BLD AUTO: 3 % (ref 0–5)
ERYTHROCYTE [DISTWIDTH] IN BLOOD BY AUTOMATED COUNT: 13.3 % (ref 11.5–17)
GLOBULIN SER-MCNC: 4.1 GM/DL
GLUCOSE SERPL-MCNC: 105 MG/DL (ref 75–116)
HCT VFR BLD AUTO: 50.5 % (ref 42–52)
HGB BLD-MCNC: 16.1 GM/DL (ref 14–18)
IMM GRANULOCYTES # BLD AUTO: 0.02 10*3/UL (ref 0–0.02)
IMM GRANULOCYTES NFR BLD AUTO: 0.2 % (ref 0–0.43)
LYMPHOCYTES # BLD AUTO: 2.8 X10(3)/MCL (ref 0.5–4.1)
LYMPHOCYTES NFR BLD AUTO: 34 % (ref 15–40)
MCH RBC QN AUTO: 32 PG (ref 27–34)
MCHC RBC AUTO-ENTMCNC: 32 GM/DL (ref 31–36)
MCV RBC AUTO: 100 FL (ref 80–99)
MONOCYTES # BLD AUTO: 0.5 X10(3)/MCL (ref 0–1.1)
MONOCYTES NFR BLD AUTO: 6 % (ref 4–12)
NEUTROPHILS # BLD AUTO: 4.7 X10(3)/MCL (ref 1.5–6.9)
NEUTROPHILS NFR BLD AUTO: 56 % (ref 43–75)
PLATELET # BLD AUTO: 309 X10(3)/MCL (ref 140–400)
PMV BLD AUTO: 10.5 FL (ref 6.8–10)
POTASSIUM SERPL-SCNC: 4.1 MMOL/L (ref 3.6–5.2)
PROT SERPL-MCNC: 8.3 GM/DL (ref 6.4–8.2)
RBC # BLD AUTO: 5.04 X10(6)/MCL (ref 4.7–6.1)
SODIUM SERPL-SCNC: 140 MMOL/L (ref 135–145)
WBC # SPEC AUTO: 8.4 X10(3)/MCL (ref 4.5–11.5)

## 2018-12-07 ENCOUNTER — HISTORICAL (OUTPATIENT)
Dept: LAB | Facility: HOSPITAL | Age: 53
End: 2018-12-07

## 2018-12-07 LAB
ABS NEUT (OLG): 5.1 X10(3)/MCL (ref 1.5–6.9)
ALBUMIN SERPL-MCNC: 3.8 GM/DL (ref 3.4–5)
ALBUMIN/GLOB SERPL: 1 RATIO
ALP SERPL-CCNC: 83 UNIT/L (ref 30–113)
ALT SERPL-CCNC: 24 UNIT/L (ref 10–45)
APPEARANCE, UA: CLEAR
AST SERPL-CCNC: 17 UNIT/L (ref 15–37)
BACTERIA SPEC CULT: NORMAL /HPF
BASOPHILS # BLD AUTO: 0 X10(3)/MCL (ref 0–0.1)
BASOPHILS NFR BLD AUTO: 0 % (ref 0–1)
BILIRUB SERPL-MCNC: 0.6 MG/DL (ref 0.1–0.9)
BILIRUB UR QL STRIP: NEGATIVE
BILIRUBIN DIRECT+TOT PNL SERPL-MCNC: 0.2 MG/DL (ref 0–0.3)
BILIRUBIN DIRECT+TOT PNL SERPL-MCNC: 0.4 MG/DL
BUN SERPL-MCNC: 5 MG/DL (ref 10–20)
CALCIUM SERPL-MCNC: 8.3 MG/DL (ref 8–10.5)
CHLORIDE SERPL-SCNC: 101 MMOL/L (ref 100–108)
CHOLEST SERPL-MCNC: 144 MG/DL (ref 140–200)
CHOLEST/HDLC SERPL: 4 MG/DL (ref 0–8)
CO2 SERPL-SCNC: 30 MMOL/L (ref 21–35)
COLOR UR: ABNORMAL
CREAT SERPL-MCNC: 0.7 MG/DL (ref 0.7–1.3)
CREAT UR-MCNC: 43.4 MG/DL
EOSINOPHIL # BLD AUTO: 0.2 X10(3)/MCL (ref 0–0.6)
EOSINOPHIL NFR BLD AUTO: 3 % (ref 0–5)
ERYTHROCYTE [DISTWIDTH] IN BLOOD BY AUTOMATED COUNT: 13.5 % (ref 11.5–17)
GLOBULIN SER-MCNC: 4 GM/DL
GLUCOSE (UA): NEGATIVE
GLUCOSE SERPL-MCNC: 89 MG/DL (ref 75–116)
HCT VFR BLD AUTO: 51.7 % (ref 42–52)
HDLC SERPL-MCNC: 35 MG/DL (ref 35–59)
HGB BLD-MCNC: 16.5 GM/DL (ref 14–18)
HGB UR QL STRIP: NEGATIVE
IMM GRANULOCYTES # BLD AUTO: 0.02 10*3/UL (ref 0–0.02)
IMM GRANULOCYTES NFR BLD AUTO: 0.2 % (ref 0–0.43)
KETONES UR QL STRIP: NEGATIVE
LDLC SERPL CALC-MCNC: 86 MG/DL (ref 100–129)
LEUKOCYTE ESTERASE UR QL STRIP: NEGATIVE
LYMPHOCYTES # BLD AUTO: 2.5 X10(3)/MCL (ref 0.5–4.1)
LYMPHOCYTES NFR BLD AUTO: 29 % (ref 15–40)
MCH RBC QN AUTO: 32 PG (ref 27–34)
MCHC RBC AUTO-ENTMCNC: 32 GM/DL (ref 31–36)
MCV RBC AUTO: 101 FL (ref 80–99)
MICROALBUMIN UR-MCNC: 17.6 MG/DL (ref 0.1–2)
MICROALBUMIN/CREAT RATIO PNL UR: 405.5 MG/GM CR (ref 0–30)
MONOCYTES # BLD AUTO: 0.6 X10(3)/MCL (ref 0–1.1)
MONOCYTES NFR BLD AUTO: 7 % (ref 4–12)
NEUTROPHILS # BLD AUTO: 5.1 X10(3)/MCL (ref 1.5–6.9)
NEUTROPHILS NFR BLD AUTO: 60 % (ref 43–75)
NITRITE UR QL STRIP: NEGATIVE
PH UR STRIP: 7 [PH]
PLATELET # BLD AUTO: 270 X10(3)/MCL (ref 140–400)
PMV BLD AUTO: 10.4 FL (ref 6.8–10)
POTASSIUM SERPL-SCNC: 3.7 MMOL/L (ref 3.6–5.2)
PROT SERPL-MCNC: 7.8 GM/DL (ref 6.4–8.2)
PROT UR QL STRIP: 30 MG/DL
PSA SERPL-MCNC: 1.01 NG/ML (ref 0–4)
RBC # BLD AUTO: 5.14 X10(6)/MCL (ref 4.7–6.1)
RBC #/AREA URNS HPF: NORMAL /HPF
SODIUM SERPL-SCNC: 140 MMOL/L (ref 135–145)
SP GR UR STRIP: 1.01
SQUAMOUS EPITHELIAL, UA: NORMAL /LPF
T3FREE SERPL-MCNC: 3.63 PG/ML (ref 2.18–3.98)
T4 SERPL-MCNC: 7.2 MCG/DL (ref 5.3–11.5)
TRIGL SERPL-MCNC: 163 MG/DL (ref 35–150)
TSH SERPL-ACNC: 0.74 MIU/ML (ref 0.36–3.74)
URATE SERPL-MCNC: 6 MG/DL (ref 2.6–7.2)
UROBILINOGEN UR STRIP-ACNC: 0.2 EU/DL
VLDLC SERPL CALC-MCNC: 33 MG/DL
WBC # SPEC AUTO: 8.5 X10(3)/MCL (ref 4.5–11.5)
WBC #/AREA URNS HPF: NORMAL /[HPF]

## 2019-01-22 ENCOUNTER — HISTORICAL (OUTPATIENT)
Dept: LAB | Facility: HOSPITAL | Age: 54
End: 2019-01-22

## 2019-01-22 LAB
ABS NEUT (OLG): 6 X10(3)/MCL (ref 1.5–6.9)
ALBUMIN SERPL-MCNC: 3.8 GM/DL (ref 3.4–5)
ALBUMIN/GLOB SERPL: 0.9 RATIO
ALP SERPL-CCNC: 94 UNIT/L (ref 30–113)
ALT SERPL-CCNC: 20 UNIT/L (ref 10–45)
AST SERPL-CCNC: 17 UNIT/L (ref 15–37)
BASOPHILS # BLD AUTO: 0.1 X10(3)/MCL (ref 0–0.1)
BASOPHILS NFR BLD AUTO: 1 % (ref 0–1)
BILIRUB SERPL-MCNC: 0.6 MG/DL (ref 0.1–0.9)
BILIRUBIN DIRECT+TOT PNL SERPL-MCNC: 0.1 MG/DL (ref 0–0.3)
BILIRUBIN DIRECT+TOT PNL SERPL-MCNC: 0.5 MG/DL
BUN SERPL-MCNC: 8 MG/DL (ref 10–20)
CALCIUM SERPL-MCNC: 8.8 MG/DL (ref 8–10.5)
CHLORIDE SERPL-SCNC: 102 MMOL/L (ref 100–108)
CO2 SERPL-SCNC: 32 MMOL/L (ref 21–35)
CREAT SERPL-MCNC: 0.73 MG/DL (ref 0.7–1.3)
EOSINOPHIL # BLD AUTO: 0.2 X10(3)/MCL (ref 0–0.6)
EOSINOPHIL NFR BLD AUTO: 2 % (ref 0–5)
ERYTHROCYTE [DISTWIDTH] IN BLOOD BY AUTOMATED COUNT: 13.7 % (ref 11.5–17)
GLOBULIN SER-MCNC: 4.2 GM/DL
GLUCOSE SERPL-MCNC: 108 MG/DL (ref 75–116)
HCT VFR BLD AUTO: 49.6 % (ref 42–52)
HGB BLD-MCNC: 15.9 GM/DL (ref 14–18)
IMM GRANULOCYTES # BLD AUTO: 0.05 10*3/UL (ref 0–0.02)
IMM GRANULOCYTES NFR BLD AUTO: 0.5 % (ref 0–0.43)
LYMPHOCYTES # BLD AUTO: 2.6 X10(3)/MCL (ref 0.5–4.1)
LYMPHOCYTES NFR BLD AUTO: 27 % (ref 15–40)
MCH RBC QN AUTO: 33 PG (ref 27–34)
MCHC RBC AUTO-ENTMCNC: 32 GM/DL (ref 31–36)
MCV RBC AUTO: 102 FL (ref 80–99)
MONOCYTES # BLD AUTO: 0.6 X10(3)/MCL (ref 0–1.1)
MONOCYTES NFR BLD AUTO: 6 % (ref 4–12)
NEUTROPHILS # BLD AUTO: 6 X10(3)/MCL (ref 1.5–6.9)
NEUTROPHILS NFR BLD AUTO: 62 % (ref 43–75)
PLATELET # BLD AUTO: 314 X10(3)/MCL (ref 140–400)
PMV BLD AUTO: 10.2 FL (ref 6.8–10)
POTASSIUM SERPL-SCNC: 4 MMOL/L (ref 3.6–5.2)
PROT SERPL-MCNC: 8 GM/DL (ref 6.4–8.2)
RBC # BLD AUTO: 4.87 X10(6)/MCL (ref 4.7–6.1)
SODIUM SERPL-SCNC: 141 MMOL/L (ref 135–145)
WBC # SPEC AUTO: 9.5 X10(3)/MCL (ref 4.5–11.5)

## 2019-01-25 ENCOUNTER — HISTORICAL (OUTPATIENT)
Dept: WOUND CARE | Facility: HOSPITAL | Age: 54
End: 2019-01-25

## 2019-02-01 ENCOUNTER — HISTORICAL (OUTPATIENT)
Dept: WOUND CARE | Facility: HOSPITAL | Age: 54
End: 2019-02-01

## 2019-02-15 ENCOUNTER — HISTORICAL (OUTPATIENT)
Dept: WOUND CARE | Facility: HOSPITAL | Age: 54
End: 2019-02-15

## 2019-03-01 ENCOUNTER — HISTORICAL (OUTPATIENT)
Dept: WOUND CARE | Facility: HOSPITAL | Age: 54
End: 2019-03-01

## 2019-03-21 ENCOUNTER — HISTORICAL (OUTPATIENT)
Dept: LAB | Facility: HOSPITAL | Age: 54
End: 2019-03-21

## 2019-03-21 LAB
ABS NEUT (OLG): 5.5 X10(3)/MCL (ref 1.5–6.9)
ALBUMIN SERPL-MCNC: 4 GM/DL (ref 3.4–5)
ALBUMIN/GLOB SERPL: 1.1 RATIO
ALP SERPL-CCNC: 87 UNIT/L (ref 30–113)
ALT SERPL-CCNC: 26 UNIT/L (ref 10–45)
AST SERPL-CCNC: 16 UNIT/L (ref 15–37)
BASOPHILS # BLD AUTO: 0 X10(3)/MCL (ref 0–0.1)
BASOPHILS NFR BLD AUTO: 0 % (ref 0–1)
BILIRUB SERPL-MCNC: 0.7 MG/DL (ref 0.1–0.9)
BILIRUBIN DIRECT+TOT PNL SERPL-MCNC: 0.2 MG/DL (ref 0–0.3)
BILIRUBIN DIRECT+TOT PNL SERPL-MCNC: 0.5 MG/DL
BUN SERPL-MCNC: 11 MG/DL (ref 10–20)
CALCIUM SERPL-MCNC: 8.5 MG/DL (ref 8–10.5)
CHLORIDE SERPL-SCNC: 102 MMOL/L (ref 100–108)
CO2 SERPL-SCNC: 28 MMOL/L (ref 21–35)
CREAT SERPL-MCNC: 0.75 MG/DL (ref 0.7–1.3)
EOSINOPHIL # BLD AUTO: 0.3 X10(3)/MCL (ref 0–0.6)
EOSINOPHIL NFR BLD AUTO: 3 % (ref 0–5)
ERYTHROCYTE [DISTWIDTH] IN BLOOD BY AUTOMATED COUNT: 13.5 % (ref 11.5–17)
GLOBULIN SER-MCNC: 3.6 GM/DL
GLUCOSE SERPL-MCNC: 92 MG/DL (ref 75–116)
HCT VFR BLD AUTO: 49 % (ref 42–52)
HGB BLD-MCNC: 15.7 GM/DL (ref 14–18)
IMM GRANULOCYTES # BLD AUTO: 0.04 10*3/UL (ref 0–0.02)
IMM GRANULOCYTES NFR BLD AUTO: 0.4 % (ref 0–0.43)
LYMPHOCYTES # BLD AUTO: 2.8 X10(3)/MCL (ref 0.5–4.1)
LYMPHOCYTES NFR BLD AUTO: 30 % (ref 15–40)
MCH RBC QN AUTO: 32 PG (ref 27–34)
MCHC RBC AUTO-ENTMCNC: 32 GM/DL (ref 31–36)
MCV RBC AUTO: 101 FL (ref 80–99)
MONOCYTES # BLD AUTO: 0.9 X10(3)/MCL (ref 0–1.1)
MONOCYTES NFR BLD AUTO: 9 % (ref 4–12)
NEUTROPHILS # BLD AUTO: 5.5 X10(3)/MCL (ref 1.5–6.9)
NEUTROPHILS NFR BLD AUTO: 58 % (ref 43–75)
PLATELET # BLD AUTO: 281 X10(3)/MCL (ref 140–400)
PMV BLD AUTO: 10.4 FL (ref 6.8–10)
POTASSIUM SERPL-SCNC: 3.6 MMOL/L (ref 3.6–5.2)
PROT SERPL-MCNC: 7.6 GM/DL (ref 6.4–8.2)
RBC # BLD AUTO: 4.85 X10(6)/MCL (ref 4.7–6.1)
SODIUM SERPL-SCNC: 139 MMOL/L (ref 135–145)
WBC # SPEC AUTO: 9.6 X10(3)/MCL (ref 4.5–11.5)

## 2019-04-15 ENCOUNTER — HISTORICAL (OUTPATIENT)
Dept: LAB | Facility: HOSPITAL | Age: 54
End: 2019-04-15

## 2019-04-15 LAB
ABS NEUT (OLG): 3.9 X10(3)/MCL (ref 1.5–6.9)
ALBUMIN SERPL-MCNC: 3.9 GM/DL (ref 3.4–5)
ALBUMIN/GLOB SERPL: 1 RATIO
ALP SERPL-CCNC: 88 UNIT/L (ref 30–113)
ALT SERPL-CCNC: 26 UNIT/L (ref 10–45)
APPEARANCE, UA: CLEAR
AST SERPL-CCNC: 17 UNIT/L (ref 15–37)
BACTERIA SPEC CULT: NORMAL /HPF
BASOPHILS # BLD AUTO: 0 X10(3)/MCL (ref 0–0.1)
BASOPHILS NFR BLD AUTO: 0 % (ref 0–1)
BILIRUB SERPL-MCNC: 0.8 MG/DL (ref 0.1–0.9)
BILIRUB UR QL STRIP: NEGATIVE
BILIRUBIN DIRECT+TOT PNL SERPL-MCNC: 0.2 MG/DL (ref 0–0.3)
BILIRUBIN DIRECT+TOT PNL SERPL-MCNC: 0.6 MG/DL
BUN SERPL-MCNC: 9 MG/DL (ref 10–20)
CALCIUM SERPL-MCNC: 8.6 MG/DL (ref 8–10.5)
CHLORIDE SERPL-SCNC: 103 MMOL/L (ref 100–108)
CHOLEST SERPL-MCNC: 137 MG/DL (ref 140–200)
CHOLEST/HDLC SERPL: 4 MG/DL (ref 0–8)
CO2 SERPL-SCNC: 32 MMOL/L (ref 21–35)
COLOR UR: ABNORMAL
CREAT SERPL-MCNC: 0.75 MG/DL (ref 0.7–1.3)
DEPRECATED CALCIDIOL+CALCIFEROL SERPL-MC: 17.99 NG/ML (ref 30–80)
EOSINOPHIL # BLD AUTO: 0.2 X10(3)/MCL (ref 0–0.6)
EOSINOPHIL NFR BLD AUTO: 3 % (ref 0–5)
ERYTHROCYTE [DISTWIDTH] IN BLOOD BY AUTOMATED COUNT: 13.4 % (ref 11.5–17)
GLOBULIN SER-MCNC: 3.9 GM/DL
GLUCOSE (UA): NEGATIVE
GLUCOSE SERPL-MCNC: 117 MG/DL (ref 75–116)
HCT VFR BLD AUTO: 52.9 % (ref 42–52)
HDLC SERPL-MCNC: 32 MG/DL (ref 35–59)
HGB BLD-MCNC: 16.9 GM/DL (ref 14–18)
HGB UR QL STRIP: NEGATIVE
IMM GRANULOCYTES # BLD AUTO: 0.02 10*3/UL (ref 0–0.02)
IMM GRANULOCYTES NFR BLD AUTO: 0.3 % (ref 0–0.43)
KETONES UR QL STRIP: NEGATIVE
LDLC SERPL CALC-MCNC: 87 MG/DL (ref 100–129)
LEUKOCYTE ESTERASE UR QL STRIP: NEGATIVE
LYMPHOCYTES # BLD AUTO: 2.5 X10(3)/MCL (ref 0.5–4.1)
LYMPHOCYTES NFR BLD AUTO: 34 % (ref 15–40)
MCH RBC QN AUTO: 32 PG (ref 27–34)
MCHC RBC AUTO-ENTMCNC: 32 GM/DL (ref 31–36)
MCV RBC AUTO: 101 FL (ref 80–99)
MONOCYTES # BLD AUTO: 0.6 X10(3)/MCL (ref 0–1.1)
MONOCYTES NFR BLD AUTO: 8 % (ref 4–12)
NEUTROPHILS # BLD AUTO: 3.9 X10(3)/MCL (ref 1.5–6.9)
NEUTROPHILS NFR BLD AUTO: 54 % (ref 43–75)
NITRITE UR QL STRIP: NEGATIVE
PH UR STRIP: 6.5 [PH]
PLATELET # BLD AUTO: 277 X10(3)/MCL (ref 140–400)
PMV BLD AUTO: 10.1 FL (ref 6.8–10)
POTASSIUM SERPL-SCNC: 4.1 MMOL/L (ref 3.6–5.2)
PROT SERPL-MCNC: 7.8 GM/DL (ref 6.4–8.2)
PROT UR QL STRIP: ABNORMAL
RBC # BLD AUTO: 5.24 X10(6)/MCL (ref 4.7–6.1)
RBC #/AREA URNS HPF: NORMAL /HPF
SODIUM SERPL-SCNC: 142 MMOL/L (ref 135–145)
SP GR UR STRIP: 1.01
SQUAMOUS EPITHELIAL, UA: NORMAL
T3FREE SERPL-MCNC: 3.22 PG/ML (ref 2.18–3.98)
T4 SERPL-MCNC: 7.6 MCG/DL (ref 5.3–11.5)
TRIGL SERPL-MCNC: 174 MG/DL (ref 35–150)
TSH SERPL-ACNC: 1.08 MIU/ML (ref 0.36–3.74)
UROBILINOGEN UR STRIP-ACNC: 0.2 EU/DL
VLDLC SERPL CALC-MCNC: 35 MG/DL
WBC # SPEC AUTO: 7.2 X10(3)/MCL (ref 4.5–11.5)
WBC #/AREA URNS HPF: NORMAL /[HPF]

## 2019-07-23 ENCOUNTER — HISTORICAL (OUTPATIENT)
Dept: LAB | Facility: HOSPITAL | Age: 54
End: 2019-07-23

## 2019-07-23 LAB
ABS NEUT (OLG): 6.2 X10(3)/MCL (ref 1.5–6.9)
ALBUMIN SERPL-MCNC: 3.8 GM/DL (ref 3.4–5)
ALBUMIN/GLOB SERPL: 1 RATIO
ALP SERPL-CCNC: 78 UNIT/L (ref 30–113)
ALT SERPL-CCNC: 25 UNIT/L (ref 10–45)
AST SERPL-CCNC: 17 UNIT/L (ref 15–37)
BASOPHILS # BLD AUTO: 0 X10(3)/MCL (ref 0–0.1)
BASOPHILS NFR BLD AUTO: 0 % (ref 0–1)
BILIRUB SERPL-MCNC: 1 MG/DL (ref 0.1–0.9)
BILIRUBIN DIRECT+TOT PNL SERPL-MCNC: 0.2 MG/DL (ref 0–0.3)
BILIRUBIN DIRECT+TOT PNL SERPL-MCNC: 0.8 MG/DL
BUN SERPL-MCNC: 12 MG/DL (ref 10–20)
CALCIUM SERPL-MCNC: 8.5 MG/DL (ref 8–10.5)
CHLORIDE SERPL-SCNC: 104 MMOL/L (ref 100–108)
CO2 SERPL-SCNC: 31 MMOL/L (ref 21–35)
CREAT SERPL-MCNC: 1.18 MG/DL (ref 0.7–1.3)
EOSINOPHIL # BLD AUTO: 0.3 X10(3)/MCL (ref 0–0.6)
EOSINOPHIL NFR BLD AUTO: 3 % (ref 0–5)
ERYTHROCYTE [DISTWIDTH] IN BLOOD BY AUTOMATED COUNT: 14 % (ref 11.5–17)
GLOBULIN SER-MCNC: 3.7 GM/DL
GLUCOSE SERPL-MCNC: 74 MG/DL (ref 75–116)
HCT VFR BLD AUTO: 50.6 % (ref 42–52)
HGB BLD-MCNC: 16.2 GM/DL (ref 14–18)
IMM GRANULOCYTES # BLD AUTO: 0.03 10*3/UL (ref 0–0.02)
IMM GRANULOCYTES NFR BLD AUTO: 0.3 % (ref 0–0.43)
LYMPHOCYTES # BLD AUTO: 3 X10(3)/MCL (ref 0.5–4.1)
LYMPHOCYTES NFR BLD AUTO: 28 % (ref 15–40)
MCH RBC QN AUTO: 32 PG (ref 27–34)
MCHC RBC AUTO-ENTMCNC: 32 GM/DL (ref 31–36)
MCV RBC AUTO: 101 FL (ref 80–99)
MONOCYTES # BLD AUTO: 1 X10(3)/MCL (ref 0–1.1)
MONOCYTES NFR BLD AUTO: 10 % (ref 4–12)
NEUTROPHILS # BLD AUTO: 6.2 X10(3)/MCL (ref 1.5–6.9)
NEUTROPHILS NFR BLD AUTO: 59 % (ref 43–75)
PLATELET # BLD AUTO: 263 X10(3)/MCL (ref 140–400)
PMV BLD AUTO: 9.5 FL (ref 6.8–10)
POTASSIUM SERPL-SCNC: 3.7 MMOL/L (ref 3.6–5.2)
PROT SERPL-MCNC: 7.5 GM/DL (ref 6.4–8.2)
RBC # BLD AUTO: 4.99 X10(6)/MCL (ref 4.7–6.1)
SODIUM SERPL-SCNC: 143 MMOL/L (ref 135–145)
WBC # SPEC AUTO: 10.6 X10(3)/MCL (ref 4.5–11.5)

## 2019-07-25 ENCOUNTER — HISTORICAL (OUTPATIENT)
Dept: INFUSION THERAPY | Facility: HOSPITAL | Age: 54
End: 2019-07-25

## 2019-07-25 LAB — LDH SERPL-CCNC: 131 UNIT/L (ref 105–241)

## 2019-08-16 ENCOUNTER — HISTORICAL (OUTPATIENT)
Dept: LAB | Facility: HOSPITAL | Age: 54
End: 2019-08-16

## 2019-08-16 LAB
ABS NEUT (OLG): 4.5 X10(3)/MCL (ref 1.5–6.9)
ALBUMIN SERPL-MCNC: 3.8 GM/DL (ref 3.4–5)
ALBUMIN/GLOB SERPL: 1 RATIO
ALP SERPL-CCNC: 85 UNIT/L (ref 30–113)
ALT SERPL-CCNC: 22 UNIT/L (ref 10–45)
APPEARANCE, UA: CLEAR
AST SERPL-CCNC: 16 UNIT/L (ref 15–37)
BACTERIA SPEC CULT: NORMAL /HPF
BILIRUB SERPL-MCNC: 0.7 MG/DL (ref 0.1–0.9)
BILIRUB UR QL STRIP: NEGATIVE
BILIRUBIN DIRECT+TOT PNL SERPL-MCNC: 0.2 MG/DL (ref 0–0.3)
BILIRUBIN DIRECT+TOT PNL SERPL-MCNC: 0.5 MG/DL
BUN SERPL-MCNC: 9 MG/DL (ref 10–20)
CALCIUM SERPL-MCNC: 8.7 MG/DL (ref 8–10.5)
CHLORIDE SERPL-SCNC: 101 MMOL/L (ref 100–108)
CHOLEST SERPL-MCNC: 131 MG/DL (ref 140–200)
CHOLEST/HDLC SERPL: 4 MG/DL (ref 0–8)
CO2 SERPL-SCNC: 29 MMOL/L (ref 21–35)
COLOR UR: ABNORMAL
CREAT SERPL-MCNC: 0.73 MG/DL (ref 0.7–1.3)
CREAT UR-MCNC: 37.4 MG/DL
DEPRECATED CALCIDIOL+CALCIFEROL SERPL-MC: 28.06 NG/ML (ref 30–80)
ERYTHROCYTE [DISTWIDTH] IN BLOOD BY AUTOMATED COUNT: 13.3 % (ref 11.5–17)
EST. AVERAGE GLUCOSE BLD GHB EST-MCNC: 108 MG/DL
GLOBULIN SER-MCNC: 4 GM/DL
GLUCOSE (UA): NEGATIVE
GLUCOSE SERPL-MCNC: 91 MG/DL (ref 75–116)
HBA1C MFR BLD: 5.4 % (ref 4.8–6)
HCT VFR BLD AUTO: 49.9 % (ref 42–52)
HDLC SERPL-MCNC: 32 MG/DL (ref 35–59)
HGB BLD-MCNC: 16.2 GM/DL (ref 14–18)
HGB UR QL STRIP: NEGATIVE
KETONES UR QL STRIP: NEGATIVE
LDLC SERPL CALC-MCNC: 89 MG/DL (ref 100–129)
LEUKOCYTE ESTERASE UR QL STRIP: NEGATIVE
MCH RBC QN AUTO: 32 PG (ref 27–34)
MCHC RBC AUTO-ENTMCNC: 32 GM/DL (ref 31–36)
MCV RBC AUTO: 100 FL (ref 80–99)
MICROALBUMIN UR-MCNC: 12.7 MG/DL (ref 0.1–2)
MICROALBUMIN/CREAT RATIO PNL UR: 339.6 MG/GM CR (ref 0–30)
NITRITE UR QL STRIP: NEGATIVE
PH UR STRIP: 6 [PH]
PLATELET # BLD AUTO: 284 X10(3)/MCL (ref 140–400)
PMV BLD AUTO: 10.4 FL (ref 6.8–10)
POTASSIUM SERPL-SCNC: 4 MMOL/L (ref 3.6–5.2)
PROT SERPL-MCNC: 7.8 GM/DL (ref 6.4–8.2)
PROT UR QL STRIP: ABNORMAL
PSA SERPL-MCNC: 0.98 NG/ML (ref 0–4)
RBC # BLD AUTO: 4.98 X10(6)/MCL (ref 4.7–6.1)
RBC #/AREA URNS HPF: NORMAL /HPF
SODIUM SERPL-SCNC: 139 MMOL/L (ref 135–145)
SP GR UR STRIP: 1.01
SQUAMOUS EPITHELIAL, UA: NORMAL /LPF
T3FREE SERPL-MCNC: 3.07 PG/ML (ref 2.18–3.98)
T4 SERPL-MCNC: 6.5 MCG/DL (ref 5.3–11.5)
TRIGL SERPL-MCNC: 110 MG/DL (ref 35–150)
TSH SERPL-ACNC: 0.68 MIU/ML (ref 0.36–3.74)
URATE SERPL-MCNC: 5.3 MG/DL (ref 2.6–7.2)
UROBILINOGEN UR STRIP-ACNC: 0.2 EU/DL
VLDLC SERPL CALC-MCNC: 22 MG/DL
WBC # SPEC AUTO: 8.1 X10(3)/MCL (ref 4.5–11.5)
WBC #/AREA URNS HPF: NORMAL /[HPF]

## 2019-11-15 ENCOUNTER — HISTORICAL (OUTPATIENT)
Dept: LAB | Facility: HOSPITAL | Age: 54
End: 2019-11-15

## 2019-11-15 LAB
ABS NEUT (OLG): 6.8 X10(3)/MCL (ref 1.5–6.9)
ALBUMIN SERPL-MCNC: 3.9 GM/DL (ref 3.4–5)
ALBUMIN/GLOB SERPL: 1.1 RATIO
ALP SERPL-CCNC: 107 UNIT/L (ref 30–113)
ALT SERPL-CCNC: 23 UNIT/L (ref 10–45)
APPEARANCE, UA: CLEAR
AST SERPL-CCNC: 16 UNIT/L (ref 15–37)
BACTERIA SPEC CULT: NORMAL /HPF
BASOPHILS # BLD AUTO: 0 X10(3)/MCL (ref 0–0.1)
BASOPHILS NFR BLD AUTO: 0 % (ref 0–1)
BILIRUB SERPL-MCNC: 0.8 MG/DL (ref 0.1–0.9)
BILIRUB UR QL STRIP: NEGATIVE
BILIRUBIN DIRECT+TOT PNL SERPL-MCNC: 0.2 MG/DL (ref 0–0.3)
BILIRUBIN DIRECT+TOT PNL SERPL-MCNC: 0.6 MG/DL
BUN SERPL-MCNC: 8 MG/DL (ref 10–20)
CALCIUM SERPL-MCNC: 8.2 MG/DL (ref 8–10.5)
CHLORIDE SERPL-SCNC: 103 MMOL/L (ref 100–108)
CHOLEST SERPL-MCNC: 124 MG/DL (ref 140–200)
CHOLEST/HDLC SERPL: 4 MG/DL (ref 0–8)
CO2 SERPL-SCNC: 29 MMOL/L (ref 21–35)
COLOR UR: ABNORMAL
CREAT SERPL-MCNC: 0.83 MG/DL (ref 0.7–1.3)
CREAT UR-MCNC: 82.9 MG/DL
DEPRECATED CALCIDIOL+CALCIFEROL SERPL-MC: 23.45 NG/ML (ref 30–80)
EOSINOPHIL # BLD AUTO: 0.3 X10(3)/MCL (ref 0–0.6)
EOSINOPHIL NFR BLD AUTO: 3 % (ref 0–5)
ERYTHROCYTE [DISTWIDTH] IN BLOOD BY AUTOMATED COUNT: 14 % (ref 11.5–17)
EST. AVERAGE GLUCOSE BLD GHB EST-MCNC: 105 MG/DL
GLOBULIN SER-MCNC: 3.6 GM/DL
GLUCOSE (UA): NEGATIVE
GLUCOSE SERPL-MCNC: 145 MG/DL (ref 75–116)
HBA1C MFR BLD: 5.3 % (ref 4.8–6)
HCT VFR BLD AUTO: 50.1 % (ref 42–52)
HDLC SERPL-MCNC: 33 MG/DL (ref 35–59)
HGB BLD-MCNC: 16.1 GM/DL (ref 14–18)
HGB UR QL STRIP: NEGATIVE
IMM GRANULOCYTES # BLD AUTO: 0.05 10*3/UL (ref 0–0.02)
IMM GRANULOCYTES NFR BLD AUTO: 0.5 % (ref 0–0.43)
KETONES UR QL STRIP: NEGATIVE
LDLC SERPL CALC-MCNC: 82 MG/DL (ref 100–129)
LEUKOCYTE ESTERASE UR QL STRIP: NEGATIVE
LYMPHOCYTES # BLD AUTO: 2.3 X10(3)/MCL (ref 0.5–4.1)
LYMPHOCYTES NFR BLD AUTO: 22 % (ref 15–40)
MCH RBC QN AUTO: 32 PG (ref 27–34)
MCHC RBC AUTO-ENTMCNC: 32 GM/DL (ref 31–36)
MCV RBC AUTO: 99 FL (ref 80–99)
MICROALBUMIN UR-MCNC: 27.1 MG/DL (ref 0.1–2)
MICROALBUMIN/CREAT RATIO PNL UR: 326.9 MG/GM CR (ref 0–30)
MONOCYTES # BLD AUTO: 0.7 X10(3)/MCL (ref 0–1.1)
MONOCYTES NFR BLD AUTO: 7 % (ref 4–12)
NEUTROPHILS # BLD AUTO: 6.8 X10(3)/MCL (ref 1.5–6.9)
NEUTROPHILS NFR BLD AUTO: 67 % (ref 43–75)
NITRITE UR QL STRIP: NEGATIVE
PH UR STRIP: 6 [PH]
PLATELET # BLD AUTO: 269 X10(3)/MCL (ref 140–400)
PMV BLD AUTO: 10.3 FL (ref 6.8–10)
POTASSIUM SERPL-SCNC: 3.7 MMOL/L (ref 3.6–5.2)
PROT SERPL-MCNC: 7.5 GM/DL (ref 6.4–8.2)
PROT UR QL STRIP: 30 MG/DL
RBC # BLD AUTO: 5.04 X10(6)/MCL (ref 4.7–6.1)
RBC #/AREA URNS HPF: NORMAL /HPF
SODIUM SERPL-SCNC: 141 MMOL/L (ref 135–145)
SP GR UR STRIP: 1.02
SQUAMOUS EPITHELIAL, UA: NORMAL
T3FREE SERPL-MCNC: 3.02 PG/ML (ref 2.18–3.98)
T4 SERPL-MCNC: 6.9 MCG/DL (ref 5.3–11.5)
TRIGL SERPL-MCNC: 85 MG/DL (ref 35–150)
TSH SERPL-ACNC: 0.86 MIU/ML (ref 0.36–3.74)
UROBILINOGEN UR STRIP-ACNC: 0.2 EU/DL
VIT B12 SERPL-MCNC: 441 PG/ML (ref 193–986)
VLDLC SERPL CALC-MCNC: 17 MG/DL
WBC # SPEC AUTO: 10.1 X10(3)/MCL (ref 4.5–11.5)
WBC #/AREA URNS HPF: NORMAL /[HPF]

## 2019-12-04 ENCOUNTER — HISTORICAL (OUTPATIENT)
Dept: LAB | Facility: HOSPITAL | Age: 54
End: 2019-12-04

## 2019-12-04 LAB
ABS NEUT (OLG): 5.7 X10(3)/MCL (ref 1.5–6.9)
ALBUMIN SERPL-MCNC: 4 GM/DL (ref 3.4–5)
ALBUMIN/GLOB SERPL: 1.1 RATIO
ALP SERPL-CCNC: 108 UNIT/L (ref 30–113)
ALT SERPL-CCNC: 23 UNIT/L (ref 10–45)
AST SERPL-CCNC: 18 UNIT/L (ref 15–37)
BASOPHILS # BLD AUTO: 0 X10(3)/MCL (ref 0–0.1)
BASOPHILS NFR BLD AUTO: 0 % (ref 0–1)
BILIRUB SERPL-MCNC: 0.9 MG/DL (ref 0.1–0.9)
BILIRUBIN DIRECT+TOT PNL SERPL-MCNC: 0.3 MG/DL (ref 0–0.3)
BILIRUBIN DIRECT+TOT PNL SERPL-MCNC: 0.6 MG/DL
BUN SERPL-MCNC: 8 MG/DL (ref 10–20)
CALCIUM SERPL-MCNC: 8.5 MG/DL (ref 8–10.5)
CHLORIDE SERPL-SCNC: 103 MMOL/L (ref 100–108)
CO2 SERPL-SCNC: 28 MMOL/L (ref 21–35)
CREAT SERPL-MCNC: 0.7 MG/DL (ref 0.7–1.3)
EOSINOPHIL # BLD AUTO: 0.2 X10(3)/MCL (ref 0–0.6)
EOSINOPHIL NFR BLD AUTO: 3 % (ref 0–5)
ERYTHROCYTE [DISTWIDTH] IN BLOOD BY AUTOMATED COUNT: 13.7 % (ref 11.5–17)
GLOBULIN SER-MCNC: 3.6 GM/DL
GLUCOSE SERPL-MCNC: 99 MG/DL (ref 75–116)
HCT VFR BLD AUTO: 49.9 % (ref 42–52)
HGB BLD-MCNC: 16.3 GM/DL (ref 14–18)
IMM GRANULOCYTES # BLD AUTO: 0.04 10*3/UL (ref 0–0.02)
IMM GRANULOCYTES NFR BLD AUTO: 0.4 % (ref 0–0.43)
LDH SERPL-CCNC: 142 UNIT/L (ref 105–241)
LYMPHOCYTES # BLD AUTO: 2.6 X10(3)/MCL (ref 0.5–4.1)
LYMPHOCYTES NFR BLD AUTO: 28 % (ref 15–40)
MCH RBC QN AUTO: 32 PG (ref 27–34)
MCHC RBC AUTO-ENTMCNC: 33 GM/DL (ref 31–36)
MCV RBC AUTO: 99 FL (ref 80–99)
MONOCYTES # BLD AUTO: 0.8 X10(3)/MCL (ref 0–1.1)
MONOCYTES NFR BLD AUTO: 8 % (ref 4–12)
NEUTROPHILS # BLD AUTO: 5.7 X10(3)/MCL (ref 1.5–6.9)
NEUTROPHILS NFR BLD AUTO: 61 % (ref 43–75)
PLATELET # BLD AUTO: 277 X10(3)/MCL (ref 140–400)
PMV BLD AUTO: 10.5 FL (ref 6.8–10)
POTASSIUM SERPL-SCNC: 3.8 MMOL/L (ref 3.6–5.2)
PROT SERPL-MCNC: 7.6 GM/DL (ref 6.4–8.2)
RBC # BLD AUTO: 5.02 X10(6)/MCL (ref 4.7–6.1)
SODIUM SERPL-SCNC: 140 MMOL/L (ref 135–145)
WBC # SPEC AUTO: 9.4 X10(3)/MCL (ref 4.5–11.5)

## 2020-02-20 ENCOUNTER — HISTORICAL (OUTPATIENT)
Dept: RADIOLOGY | Facility: HOSPITAL | Age: 55
End: 2020-02-20

## 2020-03-06 ENCOUNTER — HISTORICAL (OUTPATIENT)
Dept: RADIOLOGY | Facility: HOSPITAL | Age: 55
End: 2020-03-06

## 2020-03-16 ENCOUNTER — HISTORICAL (OUTPATIENT)
Dept: RADIOLOGY | Facility: HOSPITAL | Age: 55
End: 2020-03-16

## 2020-06-15 ENCOUNTER — HISTORICAL (OUTPATIENT)
Dept: LAB | Facility: HOSPITAL | Age: 55
End: 2020-06-15

## 2020-06-15 LAB
ABS NEUT (OLG): 8.5 X10(3)/MCL (ref 1.5–6.9)
ALBUMIN SERPL-MCNC: 4.3 GM/DL (ref 3.5–5)
ALBUMIN/GLOB SERPL: 1.3 RATIO (ref 1.1–2)
ALP SERPL-CCNC: 83 UNIT/L (ref 40–150)
ALT SERPL-CCNC: 18 UNIT/L (ref 0–55)
AST SERPL-CCNC: 21 UNIT/L (ref 5–34)
BASOPHILS # BLD AUTO: 0.1 X10(3)/MCL (ref 0–0.1)
BASOPHILS NFR BLD AUTO: 1 % (ref 0–1)
BILIRUB SERPL-MCNC: 0.8 MG/DL
BILIRUBIN DIRECT+TOT PNL SERPL-MCNC: 0.4 MG/DL (ref 0–0.5)
BILIRUBIN DIRECT+TOT PNL SERPL-MCNC: 0.4 MG/DL (ref 0–0.8)
BUN SERPL-MCNC: 7 MG/DL (ref 8.4–25.7)
CALCIUM SERPL-MCNC: 9 MG/DL (ref 8.4–10.2)
CHLORIDE SERPL-SCNC: 102 MMOL/L (ref 98–107)
CO2 SERPL-SCNC: 27 MMOL/L (ref 22–29)
CREAT SERPL-MCNC: 0.74 MG/DL (ref 0.73–1.18)
EOSINOPHIL # BLD AUTO: 0.2 X10(3)/MCL (ref 0–0.6)
EOSINOPHIL NFR BLD AUTO: 2 % (ref 0–5)
ERYTHROCYTE [DISTWIDTH] IN BLOOD BY AUTOMATED COUNT: 13.9 % (ref 11.5–17)
GLOBULIN SER-MCNC: 3.4 GM/DL (ref 2.4–3.5)
GLUCOSE SERPL-MCNC: 89 MG/DL (ref 74–100)
HCT VFR BLD AUTO: 50.5 % (ref 42–52)
HGB BLD-MCNC: 16.9 GM/DL (ref 14–18)
IMM GRANULOCYTES # BLD AUTO: 0.08 10*3/UL (ref 0–0.02)
IMM GRANULOCYTES NFR BLD AUTO: 0.6 % (ref 0–0.43)
LDH SERPL-CCNC: 235 UNIT/L (ref 140–271)
LYMPHOCYTES # BLD AUTO: 2.8 X10(3)/MCL (ref 0.5–4.1)
LYMPHOCYTES NFR BLD AUTO: 22 % (ref 15–40)
MCH RBC QN AUTO: 33 PG (ref 27–34)
MCHC RBC AUTO-ENTMCNC: 34 GM/DL (ref 31–36)
MCV RBC AUTO: 98 FL (ref 80–99)
MONOCYTES # BLD AUTO: 1 X10(3)/MCL (ref 0–1.1)
MONOCYTES NFR BLD AUTO: 8 % (ref 4–12)
NEUTROPHILS # BLD AUTO: 8.5 X10(3)/MCL (ref 1.5–6.9)
NEUTROPHILS NFR BLD AUTO: 67 % (ref 43–75)
PLATELET # BLD AUTO: 299 X10(3)/MCL (ref 140–400)
PMV BLD AUTO: 10.2 FL (ref 6.8–10)
POTASSIUM SERPL-SCNC: 4 MMOL/L (ref 3.5–5.1)
PROT SERPL-MCNC: 7.7 GM/DL (ref 6.4–8.3)
RBC # BLD AUTO: 5.14 X10(6)/MCL (ref 4.7–6.1)
SODIUM SERPL-SCNC: 142 MMOL/L (ref 136–145)
WBC # SPEC AUTO: 12.6 X10(3)/MCL (ref 4.5–11.5)

## 2020-07-14 ENCOUNTER — HISTORICAL (OUTPATIENT)
Dept: LAB | Facility: HOSPITAL | Age: 55
End: 2020-07-14

## 2020-07-14 LAB
ABS NEUT (OLG): 5.7 X10(3)/MCL (ref 1.5–6.9)
ALBUMIN SERPL-MCNC: 3.9 GM/DL (ref 3.5–5)
ALBUMIN/GLOB SERPL: 1.1 RATIO (ref 1.1–2)
ALP SERPL-CCNC: 87 UNIT/L (ref 40–150)
ALT SERPL-CCNC: 21 UNIT/L (ref 0–55)
AST SERPL-CCNC: 16 UNIT/L (ref 5–34)
BASOPHILS # BLD AUTO: 0.1 X10(3)/MCL (ref 0–0.1)
BASOPHILS NFR BLD AUTO: 1 % (ref 0–1)
BILIRUB SERPL-MCNC: 0.5 MG/DL
BILIRUBIN DIRECT+TOT PNL SERPL-MCNC: 0.2 MG/DL (ref 0–0.5)
BILIRUBIN DIRECT+TOT PNL SERPL-MCNC: 0.3 MG/DL (ref 0–0.8)
BUN SERPL-MCNC: 7 MG/DL (ref 8.4–25.7)
CALCIUM SERPL-MCNC: 8.9 MG/DL (ref 8.4–10.2)
CHLORIDE SERPL-SCNC: 106 MMOL/L (ref 98–107)
CO2 SERPL-SCNC: 28 MMOL/L (ref 22–29)
CREAT SERPL-MCNC: 0.75 MG/DL (ref 0.73–1.18)
EOSINOPHIL # BLD AUTO: 0.2 X10(3)/MCL (ref 0–0.6)
EOSINOPHIL NFR BLD AUTO: 2 % (ref 0–5)
ERYTHROCYTE [DISTWIDTH] IN BLOOD BY AUTOMATED COUNT: 13.9 % (ref 11.5–17)
GLOBULIN SER-MCNC: 3.4 GM/DL (ref 2.4–3.5)
GLUCOSE SERPL-MCNC: 147 MG/DL (ref 74–100)
HCT VFR BLD AUTO: 49.8 % (ref 42–52)
HGB BLD-MCNC: 16.7 GM/DL (ref 14–18)
IMM GRANULOCYTES # BLD AUTO: 0.07 10*3/UL (ref 0–0.02)
IMM GRANULOCYTES NFR BLD AUTO: 0.7 % (ref 0–0.43)
LDH SERPL-CCNC: 136 UNIT/L (ref 140–271)
LYMPHOCYTES # BLD AUTO: 3.1 X10(3)/MCL (ref 0.5–4.1)
LYMPHOCYTES NFR BLD AUTO: 32 % (ref 15–40)
MCH RBC QN AUTO: 34 PG (ref 27–34)
MCHC RBC AUTO-ENTMCNC: 34 GM/DL (ref 31–36)
MCV RBC AUTO: 100 FL (ref 80–99)
MONOCYTES # BLD AUTO: 0.7 X10(3)/MCL (ref 0–1.1)
MONOCYTES NFR BLD AUTO: 7 % (ref 4–12)
NEUTROPHILS # BLD AUTO: 5.7 X10(3)/MCL (ref 1.5–6.9)
NEUTROPHILS NFR BLD AUTO: 58 % (ref 43–75)
PLATELET # BLD AUTO: 288 X10(3)/MCL (ref 140–400)
PMV BLD AUTO: 10.4 FL (ref 6.8–10)
POTASSIUM SERPL-SCNC: 3.3 MMOL/L (ref 3.5–5.1)
PROT SERPL-MCNC: 7.3 GM/DL (ref 6.4–8.3)
RBC # BLD AUTO: 4.96 X10(6)/MCL (ref 4.7–6.1)
SODIUM SERPL-SCNC: 144 MMOL/L (ref 136–145)
WBC # SPEC AUTO: 9.8 X10(3)/MCL (ref 4.5–11.5)

## 2020-07-17 ENCOUNTER — HISTORICAL (OUTPATIENT)
Dept: WOUND CARE | Facility: HOSPITAL | Age: 55
End: 2020-07-17

## 2020-09-18 ENCOUNTER — HISTORICAL (OUTPATIENT)
Dept: RADIOLOGY | Facility: HOSPITAL | Age: 55
End: 2020-09-18

## 2020-10-06 ENCOUNTER — HISTORICAL (OUTPATIENT)
Dept: ANESTHESIOLOGY | Facility: HOSPITAL | Age: 55
End: 2020-10-06

## 2022-04-10 ENCOUNTER — HISTORICAL (OUTPATIENT)
Dept: ADMINISTRATIVE | Facility: HOSPITAL | Age: 57
End: 2022-04-10

## 2022-04-11 ENCOUNTER — HISTORICAL (OUTPATIENT)
Dept: ADMINISTRATIVE | Facility: HOSPITAL | Age: 57
End: 2022-04-11

## 2022-04-28 VITALS
HEIGHT: 68 IN | SYSTOLIC BLOOD PRESSURE: 172 MMHG | BODY MASS INDEX: 47.14 KG/M2 | WEIGHT: 311.06 LBS | DIASTOLIC BLOOD PRESSURE: 90 MMHG | OXYGEN SATURATION: 95 %

## 2022-04-28 VITALS
BODY MASS INDEX: 47.14 KG/M2 | HEIGHT: 68 IN | DIASTOLIC BLOOD PRESSURE: 90 MMHG | WEIGHT: 311.06 LBS | SYSTOLIC BLOOD PRESSURE: 172 MMHG | OXYGEN SATURATION: 95 %

## 2022-04-30 NOTE — OP NOTE
Patient:   Deny Romero Jr             MRN: [id number]            FIN: [id number]               Age:   55 years     Sex:  Male     :  1965   Associated Diagnoses:   Open wound of right lower leg; SCC (squamous cell carcinoma), leg   Author:   Dilma Burnham MD      Operative Note   Operative Information   Date/ Time:  10/6/2020 14:00:00.     Procedures Performed: Procedure Code   Excision, malignant lesion including margins, trunk, arms, or legs; excised diameter 0.6 to 1.0 cm (90373)..     Indications: 55-year-old white male with chronic wound to the right lower extremity overlying the tibia with previous biopsy of squamous cell carcinoma need of reexcision for therapeutic and diagnostic purposes.     Preoperative Diagnosis: Open wound of right lower leg (SHY84-DK S81.801A), SCC (squamous cell carcinoma), leg (ESF09-ZX C44.721).     Postoperative Diagnosis: Open wound of right lower leg (GQX80-TD S81.801A), SCC (squamous cell carcinoma), leg (KFO42-SL C44.721).     Surgeon: Dilma Burnham MD.     Anesthesia: General.     Speciman Removed: 2 x 1 cm ellipse of skin with centralized necrotic ulcer oriented with a short superior and a long lateral suture.     Description of Procedure/Findings/    Complications: Patient was brought to the operative theater laid in a supine position and general endotracheal anesthesia provided.  Preoperative antibiotics administered.  There of the right lower extremity from the knee to the ankle were then sterilely prepped and draped in normal surgical fashion using chlorhexidine.  1% lidocaine with epinephrine infiltrate the subcutaneous tissue surrounding on the ulcerated lesion along the tibia along the right leg.  The skin was demarcated for appropriate margins and a #15 blade was used to incise the skin with dissection down to underlying fatty tissues.  Upon complete excision the cavity was made hemostatic using Bovie cauterization the specimen was then  oriented with a short superior and long lateral suture and the wound was closed in a multilayered fashion using 3-0 Vicryl and 2-0 nylon at the skin interrupted.  Sterile dressing was then placed upon the wound.  Patient was relieved of anesthesia stable condition and transferred to postanesthesia care unit.     Esimated blood loss: loss  3  cc.     Complications: None.

## 2022-04-30 NOTE — H&P
CHIEF COMPLAINT:  Possible tonsil lesion.    HISTORY:  This patient is a 52-year-old white male with at least a 40-pack year history of smoking who had undergone excision of a nonhealing skin lesion involving the right lower leg approximately 9-10 weeks ago.  The pathology report indicated the presence of squamous cell carcinoma in that lesion.  This was apparently an unexpected finding.  Subsequently, the patient had been referred for possible radiation therapy and the radiation oncologist had requested a PET-CT scan for further evaluation.  The scan did show the presence of hypermetabolic activity in the region of the right tonsillar pillar, and also some activity in the left submandibular region.  He also had hypermetabolic axillary lymph nodes bilaterally, and some hypermetabolic pelvic iliac lymph nodes.  There was also hypermetabolic activity in the right lower leg where the tumor had been excised.     Dr. Lanette Plaza had referred the patient to me for further evaluation of the head and neck abnormalities noted on the scan.  The patient had not had any complaints of sore throat, difficulty swallowing, hemoptysis, ear pain, shortness of breath, fever, night sweats or weight loss.  He is status post tonsillectomy and adenoidectomy at the age of 4.  In regard to his tobacco use, he smokes 1-1/2 packs of cigarettes per day and has done so since he was 14 years old.     When he was initially seen by me on September 25, 2017, he had some area of erythema involving the right tonsillar fossa region, but no visible or palpable masses.  He had no lesions noted in the hypopharynx or larynx, or in the oral cavity or oropharynx other than the area of erythema.  He had no palpable cervical adenopathy.     He was treated with a 10-day course of Augmentin and seen for followup.  There was no change in his physical examination.  There was still noted to be some erythema in the region of the right tonsil fossa, but  again no palpable or visual masses.     He is being admitted to the hospital today by Dr. Dilma Burnham, for sentinel node biopsy of a lymph node related to the lower leg lesion.  The plans are to also perform direct laryngoscopy at that time, and biopsy the right tonsillar fossa area.    PAST MEDICAL HISTORY:  Significant for hypertension.  He does give a prior history of congestive heart failure.    PAST SURGICAL HISTORY:  Status post tonsillectomy and adenoidectomy, and excision of squamous cell carcinoma from the right lower leg in August 2017.    CURRENT MEDICATIONS:    1. Coreg.   2. Lisinopril.   3. Norvasc.   4. Potassium chloride.   5. Lasix.   6. Isosorbide mononitrate.   7. Niacin.    DRUG ALLERGIES:  No known drug allergies.      REVIEW OF SYSTEMS:  Unremarkable except as mentioned above.    SOCIAL HISTORY:  The patient has at least a 40 pack year history of smoking.  He drinks alcohol socially.  No illicit drug use.  He is single and unemployed at this time.    FAMILY HISTORY:  Unremarkable for bleeding diathesis or anesthetic complications.  There is a family history of hypertension.  No history of cancer.    PHYSICAL EXAMINATION:  VITAL SIGNS:  Weight 269 pounds.  He is afebrile.  Heart rate 73, blood pressure 175/89.     GENERAL:  Well-developed, well-nourished white male in no acute distress.  Voice is normal.  He does not have any hoarseness.     HEAD AND FACE:  Normocephalic.  No facial lesions.     EARS:  External auditory canals are clear bilaterally.  Tympanic membranes are nonerythematous.  No middle ear effusions.     NOSE:  Nasal dorsum is unremarkable.  No significant septal deviation or septal perforation.  No intranasal congestion.  Oral cavity and oropharynx, tongue and floor of mouth are unremarkable.  He does have some mild erythema involving the region of the right tonsillar fossa and pillars, but no visible or palpable mass.  No pharyngeal exudates.  The remainder of the oropharynx is  unremarkable.  Base of tongue is normal on examination with a mirror.  Larynx and hypopharynx are unremarkable on mirror examination.     NECK:  Supple without palpable adenopathy or thyromegaly.  Trachea is in the midline.  Parotid and submandibular glands are normal to palpation.     EYES:  Extraocular muscles are intact.  No periorbital edema or ecchymosis.     NEUROLOGIC:  Alert.  Cranial nerves II through XII are grossly normal.    IMPRESSION:  Hypermetabolic right tonsil fossa on PET-CT scan in a patient with a history of tobacco use and squamous cell carcinoma of the right lower extremity.    PLAN:  Plan for direct laryngoscopy and right tonsillar fossa and oropharyngeal biopsy on October 13, 2017, in conjunction with Dr. Burnham's procedure.        CLARA/CASSANDRA   DD: 10/13/2017 0822   DT: 10/13/2017 0922  Job # [id number]

## 2022-04-30 NOTE — CONSULTS
Patient:   Deny Romero             MRN: [id number]            FIN: [id number]               Age:   52 years     Sex:  Male     :  1965   Associated Diagnoses:   SCC (squamous cell carcinoma), leg; LA (lymphadenopathy); Pelvic lymphadenopathy; CAD - Coronary artery disease; Anemia; Hyperlipemia; Cellulitis   Author:   Zulma Escamilla MD, Randyjeremi Morenoe      Chief Complaint   10/23/2017 14:27 CDT     Post op excision SLN groin biopsy on right        History of Present Illness   Admitted for drainage from site of biopsy in right groin.  Originally just drainage and progressed to cellulitis around the area so he was admitted for abx and wound care.  Pt states he already feels better since wound vac applied.  He was visited by Wound care clinic today         Review of Systems   Constitutional:  Negative.    Eye:  Negative.    Ear/Nose/Mouth/Throat:  Negative.    Respiratory:  Negative.    Cardiovascular:  Negative.    Gastrointestinal:  Negative.    Genitourinary:  Negative.    Hematology/Lymphatics:  Negative.    Endocrine:  Negative.    Immunologic:  Negative.    Musculoskeletal:  Negative.    Integumentary:  with wound vac,trace serosang drainage. area around well granulated. .    Neurologic:  Negative.    Psychiatric:  Negative.    ROS reviewed as documented in chart      Health Status   Allergies:    Allergic Reactions (Selected)  No Known Allergies  No Known Medication Allergies   Current medications:  (Selected)   Inpatient Medications  Ordered  Coreg 25 mg oral tablet: 25 mg, form: Tab, Oral, BID, first dose 10/23/17 22:00:00 CDT, 24,034  Flagyl 500 mg / 100 ml premix: 500 mg, form: Infusion, IV Piggyback, q6hr, Infuse over: 1 hr, first dose 10/23/17 20:00:00 CDT, 30,022  Niaspan  mg oral tablet, extended release: 1,000 mg, form: Tab-CR, Oral, At Bedtime, first dose 10/24/17 21:00:00 CDT, 84684  Norco 5 mg-325 mg oral tablet: 1 tab(s), form: Tab, Oral, q4hr PRN for pain, first dose 10/23/17  19:49:00 CDT  Norvasc: 5 mg, form: Tab, Oral, BID, first dose 10/23/17 22:00:00 CDT, 24,034  ProAir HFA 90 mcg/inh inhalation aerosol with adapter: 2 puff(s), 180 mcg =, form: Inhaler, INH, q4hr PRN for wheezing, first dose 10/24/17 13:26:00 CDT  Zosyn 3.375 gm (for IVPB): 3.375 gm, form: Injection, IV Piggyback, q6hr, Infuse over: 1 hr, first dose 10/23/17 17:00:00 CDT, 30,022  furosemide 20 mg oral tablet: 20 mg, form: Tab, Oral, BID, first dose 10/24/17 18:00:00 CDT, 24,037  hydrALAZINE 25 mg oral tablet: 25 mg, form: Tab, Oral, BID, first dose 10/23/17 22:00:00 CDT, 24,034  isosorbide MONOnitrate 30 mg oral tablet, Extended Release: 30 mg, form: Tab-ER, Oral, Daily, first dose 10/25/17 9:00:00 CDT, 23  lisinopril 20 mg oral tablet: 40 mg, form: Tab, Oral, BID, first dose 10/24/17 21:00:00 CDT, 24,034  olodaterol-tiotropium 2.5 mcg-2.5 mcg inhalation aerosol: 2, form: Aerosol, INH, q24hr, first dose 10/24/17 14:00:00 CDT  simvastatin 10 mg oral tablet: 10 mg, form: Tab, Oral, Once a day (at bedtime), first dose 10/24/17 21:00:00 CDT, 53  tiZANidine 4 mg oral tablet: 4 mg, form: Tab, Oral, q8hr, first dose 10/24/17 14:00:00 CDT, 30,023  Prescriptions  Prescribed  Decara 50,000 intl units oral capsule: 50,000 IntUnit = 1 cap(s), Oral, qWeek, # 13 cap(s), 8 Refill(s), [facility]: [id number]  ProAir HFA 90 mcg/inh inhalation aerosol with adapter: 2 puff(s), INH, q4hr, PRN PRN for wheezing, # 1 EA, 4 Refill(s), [facility]: [id number]  amLODIPine 5 mg oral tablet: See Instructions, TAKE ONE TABLET BY MOUTH TWICE DAILY, # 60 tab(s), 2 Refill(s), [facility]: [id number]  carvedilol 25 mg oral tablet: See Instructions, TAKE ONE TABLET BY MOUTH TWICE DAILY, # 180 tab(s), 5 Refill(s), [facility]: [id number]  furosemide 20 mg oral tablet: 20 mg = 1 tab(s), Oral, BID, # 180 tab(s), 1 Refill(s), [facility]: [id number]  hydrALAZINE 25 mg oral tablet: See Instructions, TAKE ONE TABLET BY MOUTH TWICE DAILY, # 120  tab(s), 7 Refill(s), [facility]: [id number]  isosorbide MONOnitrate 30 mg oral tablet, Extended Release: 30 mg = 1 tab(s), Oral, Daily, # 90 tab(s), 2 Refill(s), [id number]  lisinopril 40 mg oral tablet: See Instructions, TAKE ONE TABLET BY MOUTH TWICE DAILY, # 180 tab(s), 1 Refill(s), [facility]: [id number]  niacin 1000 mg oral tablet, extended release: 1,000 mg = 1 tab(s), Oral, Once a day (at bedtime), # 90 tab(s), 3 Refill(s), [facility]: [id number]  olodaterol-tiotropium 2.5 mcg-2.5 mcg inhalation aerosol: = 2 puff(s), INH, q24hr, # 1 EA, 6 Refill(s), [facility]: [id number]  simvastatin 10 mg oral tablet: See Instructions, TAKE ONE TABLET BY MOUTH NIGHTLY AT BEDTIME, # 90 tab(s), 5 Refill(s), [facility]: [id number]  tiZANidine 4 mg oral tablet: 4 mg = 1 tab(s), Oral, q8hr, # 42 tab(s), 0 Refill(s)  Documented Medications  Documented  potassium chloride 10 mEq capsule, extended release: 10 mEq = 1 cap(s), Oral, qAM   Problem list:    All Problems  Hyperlipemia / SNOMED CT [id number] / Confirmed  Hypertension / SNOMED CT 06160253 / Confirmed  Anemia / SNOMED CT 777233254 / Confirmed  CAD - Coronary artery disease / SNOMED CT 5351842843 / Confirmed  Morbid obesity / SNOMED CT 093787779 / Probable  Chronic radicular low back pain / SNOMED CT 801909256 / Confirmed  Vitamin D deficiency / SNOMED CT 77958836 / Confirmed  Squamous cell carcinoma - category / SNOMED CT 5323486270 / Confirmed  tissue from right lower leg  Granulation tissue / SNOMED CT 825679910 / Confirmed  inflamed granulation tissue from right lower leg  SCC (squamous cell carcinoma), leg / SNOMED CT 7822691800 / Confirmed  LA (lymphadenopathy) / SNOMED CT 37456137 / Confirmed  Abnormal PET scan of head / SNOMED CT 570642272 / Confirmed  Axillary lymphadenopathy / SNOMED CT 936578 / Confirmed  Pelvic lymphadenopathy / SNOMED CT 318199 / Confirmed  Inguinal lymphadenopathy / SNOMED CT 924341 /  Confirmed  Tobacco user / SNOMED CT 375227840 / Probable  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  Comments:  10/19/2017 CDT 7:42 CDT Andreia Yousif LPN  right groin sentinel lymph node biopsy-benign lymph node w/ reactive follicular hyperplasia, dermatopathic change and interfollicular plasamacytosis  Squamous cell carcinoma - category (0841915188): Onset on 7/28/2017 at 51 years.  Comments:  8/18/2017 CDT 11:14 CDAndreia Duncan LPN.  tissue from right lower leg  Granulation tissue (541955611): Onset on 7/28/2017 at 51 years.  Comments:  8/18/2017 CDT 11:15 CDAndreia Duncan LPN  inflamed granulation tissue from right lower leg  Hyperlipemia ([id number])  Hypertension (44066004)  Anemia (386612326)  CAD - Coronary artery disease (5738879736)  Chronic radicular low back pain (088685099)  Axillary lymphadenopathy (691149)  Pelvic lymphadenopathy (356362)  Inguinal lymphadenopathy (173798)  Resolved  Polyp of colon (417319464):  Resolved.  Hemorrhoids (842189132):  Resolved.  Tobacco user (978195091):  Resolved.   Social History        Social & Psychosocial Habits    Alcohol  10/11/2017  Use: Current    Type: Beer    Frequency: 3-5 times per week    Average drinks per episode in last year: 6    Employment/School  06/07/2016  Status: disabled    Exercise    Comment: as tolerated - 06/07/2016 11:02 - Dali Jimenez LPN  M    Home/Environment  06/07/2016  Living situation: Home/Independent    Home equipment: Respiratory treatments    Alcohol abuse in household: No    Substance abuse in household: No    Smoker in household: No    Injuries/Abuse/Neglect in household: No    Feels unsafe at home: No    Safe place to go: Yes    Family/Friends available to help: Yes    Concern for family members at home: No    Nutrition/Health  04/29/2016  Caffeine intake amount: 2 servings per day    Sexual  06/07/2016  Sexually active: Yes    Substance Abuse  04/29/2016  Use: Never    Tobacco  10/11/2017  Use: Current every day smoker    Tobacco use per day: 1.5    Total pack years: 38  .        Physical Examination   General:  Alert and oriented, No acute distress.    Eye:  Pupils are equal, round and reactive to light, Extraocular movements are intact, Normal conjunctiva.    HENT:  Normocephalic, Normal hearing, Oral mucosa is moist, No pharyngeal erythema.    Neck:  Supple, No carotid bruit, No jugular venous distention, No lymphadenopathy, No thyromegaly.    Respiratory:  Lungs are clear to auscultation, Breath sounds are equal.    Cardiovascular:  Normal rate, Regular rhythm, No murmur, Good pulses equal in all extremities, Normal peripheral perfusion, No edema.    Gastrointestinal:  Soft, Non-tender, Non-distended, Normal bowel sounds.    Genitourinary:  No costovertebral angle tenderness.       Vital Signs (last 24 hrs)_____  Last Charted___________  Temp Oral     36.5 DegC  (OCT 24 11:00)  Resp Rate         18 br/min  (OCT 24 11:00)  SBP      139 mmHg  (OCT 24 11:00)  DBP      83 mmHg  (OCT 24 11:00)  SpO2      96 %  (OCT 24 11:00)     Lymphatics:  No lymphadenopathy neck, axilla, groin.    Musculoskeletal:  Normal range of motion, Normal strength, No tenderness, No swelling, No deformity.    Neurologic:  Alert, Oriented, Normal sensory, Normal motor function, No focal deficits, Cranial Nerves II-XII are grossly intact, Normal deep tendon  reflexes.    Psychiatric:  Cooperative, Appropriate mood & affect, Normal judgment.    Integumentary:  Warm, Dry, Intact.         Integumentary exam: right anterior groing with erythema from fold to mid thigh, + drainage, serousanga, wound vac intact  .    R shin region with macerated  central region      Health Maintenance      Health Maintenance     Pending (in the next year)        Due            Alcohol Misuse Screening due  10/24/17  and every 1  year(s)           Coronary Artery Disease Maintenance-Antiplatelet Agent Prescribed due  10/24/17  and every 1  year(s)           Hypertension Management-Education due  10/24/17  and every 1  year(s)           Tetanus Vaccine due  10/24/17  and every 10  year(s)        Due In Future            Hypertension Management-Blood Pressure not due until  04/24/18  and every 6  month(s)           Aspirin Therapy for CVD Prevention not due until  08/02/18  and every 1  year(s)           Influenza Vaccine not due until  10/02/18  and every 1  year(s)           Coronary Artery Disease Maintenance-Electrocardiogram not due until  10/13/18  and every 1  year(s)           Body Mass Index Check not due until  10/23/18  and every 1  year(s)           Depression Screening not due until  10/23/18  and every 1  year(s)           Obesity Screening not due until  10/23/18  and every 1  year(s)           Smoking Cessation not due until  10/23/18  and every 1  year(s)           Tobacco Use Screening not due until  10/23/18  and every 1  year(s)     Satisfied (in the past 1 year)        Satisfied            Aspirin Therapy for CVD Prevention on  08/02/17.  Satisfied by Luanne Edwards           Blood Pressure Screening on  10/24/17.  Satisfied by Twin Damon           Body Mass Index Check on  10/23/17.  Satisfied by Andreia Romero LPN           Coronary Artery Disease Maintenance-Lipid Lowering Therapy on  10/24/17.  Satisfied by Zulma Escamilla MD, Randy Patel            Depression Screening on  10/23/17.  Satisfied by Andreia Romero LPN           Diabetes Screening on  10/24/17.  Satisfied by Radha Giles           Hypertension Management-BMP on  10/24/17.  Satisfied by Radha Giles           Hypertension Management-Blood Pressure on  10/24/17.  Satisfied by Twin Damon           Influenza Vaccine on  10/13/17.  Satisfied by Elissa Machuca RN           Lipid Screening on  09/08/17.  Satisfied by Radha Giles           Obesity Screening on  10/23/17.  Satisfied by Andreia Romero LPN           Smoking Cessation on  10/23/17.  Satisfied by Radha Puckett RN           Tobacco Use Screening on  10/23/17.  Satisfied by Radha Puckett RN          Impression and Plan   Diagnosis     SCC (squamous cell carcinoma), leg (XGO90-XH C44.721).     LA (lymphadenopathy) (RZZ27-AM R59.1).     Pelvic lymphadenopathy (FBB36-HB R59.0).     CAD - Coronary artery disease (ANO01-NO I25.10).     Anemia (OCL16-AG D64.9).     Hyperlipemia (ESU15-MK E78.5).     Cellulitis (PUH87-QH L03.90).     Course:  Improving.    Plan:  agree with previous management    agree with wound care/wound vac    restarting home meds    appreciate consult, will follow along and manage bp and lipids, electrolytes as needed    .    Patient Instructions:       Counseled: Patient, Regarding medications, Activity.

## 2022-04-30 NOTE — CONSULTS
Patient:   Deny Romero             MRN: [id number]            FIN: [id number]               Age:   52 years     Sex:  Male     :  1965   Associated Diagnoses:   SCC (squamous cell carcinoma), leg; LA (lymphadenopathy); Pelvic lymphadenopathy; CAD - Coronary artery disease; Anemia; Hyperlipemia; Cellulitis   Author:   Yomaira Mcleod NP      Chief Complaint      History of Present Illness   10/25/17:  pt doing well, no acute changes. followed by wound care. labs stable.   awaiting home wound vac machine coverage for discharge.          Review of Systems   Constitutional:  Negative.    Eye:  Negative.    Ear/Nose/Mouth/Throat:  Negative.    Respiratory:  Negative.    Cardiovascular:  Negative.    Gastrointestinal:  Negative.    Genitourinary:  Negative.    Hematology/Lymphatics:  Negative.    Endocrine:  Negative.    Immunologic:  Negative.    Musculoskeletal:  Negative.    Integumentary:  with wound vac,trace serosang drainage. area around well granulated. .    Neurologic:  Negative.    Psychiatric:  Negative.    ROS reviewed as documented in chart      Health Status   Allergies:    Allergic Reactions (Selected)  No Known Allergies  No Known Medication Allergies   Current medications:  (Selected)   Inpatient Medications  Ordered  Coreg 25 mg oral tablet: 25 mg, form: Tab, Oral, BID, first dose 10/23/17 22:00:00 CDT,   Flagyl 500 mg / 100 ml premix: 500 mg, form: Infusion, IV Piggyback, q6hr, Infuse over: 1 hr, first dose 10/23/17 20:00:00 CDT, 30,022  Niaspan  mg oral tablet, extended release: 1,000 mg, form: Tab-CR, Oral, At Bedtime, first dose 10/24/17 21:00:00 CDT, 37206  Norco 5 mg-325 mg oral tablet: 1 tab(s), form: Tab, Oral, q4hr PRN for pain, first dose 10/23/17 19:49:00 CDT  Norvasc: 5 mg, form: Tab, Oral, BID, first dose 10/23/17 22:00:00 CDT, ,  ProAir HFA 90 mcg/inh inhalation aerosol with adapter: 2 puff(s), 180 mcg =, form: Inhaler, INH, q4hr PRN for wheezing,  first dose 10/24/17 13:26:00 CDT  Zosyn 3.375 gm (for IVPB): 3.375 gm, form: Injection, IV Piggyback, q6hr, Infuse over: 1 hr, first dose 10/23/17 17:00:00 CDT, 30,022  furosemide 20 mg oral tablet: 20 mg, form: Tab, Oral, BID, first dose 10/24/17 18:00:00 CDT, 24,037  hydrALAZINE 25 mg oral tablet: 25 mg, form: Tab, Oral, BID, first dose 10/23/17 22:00:00 CDT, 24,034  isosorbide MONOnitrate 30 mg oral tablet, Extended Release: 30 mg, form: Tab-ER, Oral, Daily, first dose 10/25/17 9:00:00 CDT, 23  lisinopril 20 mg oral tablet: 40 mg, form: Tab, Oral, BID, first dose 10/24/17 21:00:00 CDT, 24,034  olodaterol-tiotropium 2.5 mcg-2.5 mcg inhalation aerosol: 2, form: Aerosol, INH, q24hr, first dose 10/24/17 14:00:00 CDT  simvastatin 10 mg oral tablet: 10 mg, form: Tab, Oral, Once a day (at bedtime), first dose 10/24/17 21:00:00 CDT, 53  tiZANidine 4 mg oral tablet: 4 mg, form: Tab, Oral, q8hr, first dose 10/24/17 14:00:00 CDT, 30,023  Prescriptions  Prescribed  Decara 50,000 intl units oral capsule: 50,000 IntUnit = 1 cap(s), Oral, qWeek, # 13 cap(s), 8 Refill(s), [facility]: [id number]  ProAir HFA 90 mcg/inh inhalation aerosol with adapter: 2 puff(s), INH, q4hr, PRN PRN for wheezing, # 1 EA, 4 Refill(s), [facility]: [id number]  amLODIPine 5 mg oral tablet: See Instructions, TAKE ONE TABLET BY MOUTH TWICE DAILY, # 60 tab(s), 2 Refill(s), [facility]: [id number]  carvedilol 25 mg oral tablet: See Instructions, TAKE ONE TABLET BY MOUTH TWICE DAILY, # 180 tab(s), 5 Refill(s), [facility]: [id number]  furosemide 20 mg oral tablet: 20 mg = 1 tab(s), Oral, BID, # 180 tab(s), 1 Refill(s), [facility]: [id number]  hydrALAZINE 25 mg oral tablet: See Instructions, TAKE ONE TABLET BY MOUTH TWICE DAILY, # 120 tab(s), 7 Refill(s), [facility]: [id number]  isosorbide MONOnitrate 30 mg oral tablet, Extended Release: 30 mg = 1 tab(s), Oral, Daily, # 90 tab(s), 2 Refill(s), [facility]: [id number]  lisinopril 40 mg oral  tablet: See Instructions, TAKE ONE TABLET BY MOUTH TWICE DAILY, # 180 tab(s), 1 Refill(s), [facility]: [id number]  niacin 1000 mg oral tablet, extended release: 1,000 mg = 1 tab(s), Oral, Once a day (at bedtime), # 90 tab(s), 3 Refill(s), [facility]: [id number]  olodaterol-tiotropium 2.5 mcg-2.5 mcg inhalation aerosol: = 2 puff(s), INH, q24hr, # 1 EA, 6 Refill(s), [facility]: [id number]  simvastatin 10 mg oral tablet: See Instructions, TAKE ONE TABLET BY MOUTH NIGHTLY AT BEDTIME, # 90 tab(s), 5 Refill(s), [facility]: [id number]  tiZANidine 4 mg oral tablet: 4 mg = 1 tab(s), Oral, q8hr, # 42 tab(s), 0 Refill(s)  Documented Medications  Documented  potassium chloride 10 mEq capsule, extended release: 10 mEq = 1 cap(s), Oral, qAM   Problem list:    All Problems  Anemia / SNOMED CT 507753374 / Confirmed  Axillary lymphadenopathy / SNOMED CT 829584 / Confirmed  CAD - Coronary artery disease / SNOMED CT 7824022188 / Confirmed  Granulation tissue / SNOMED CT 967962494 / Confirmed  inflamed granulation tissue from right lower leg  Abnormal PET scan of head / SNOMED CT 293725231 / Confirmed  Hyperlipemia / SNOMED CT [id number] / Confirmed  Hypertension / SNOMED CT 40955156 / Confirmed  Inguinal lymphadenopathy / SNOMED CT 854191 / Confirmed  Chronic radicular low back pain / SNOMED CT 030467555 / Confirmed  LA (lymphadenopathy) / SNOMED CT 39293378 / Confirmed  Morbid obesity / SNOMED CT 941777369 / Probable  Pelvic lymphadenopathy / SNOMED CT 220195 / Confirmed  Plasmacytosis / SNOMED CT 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  Comments:  10/19/2017 CDT 7:42 CDT - Andreia Romero LPN  right groin sentinel lymph node biopsy-benign lymph node w/ reactive follicular hyperplasia, dermatopathic change and interfollicular plasamacytosis  Squamous cell carcinoma - category (0395211176): Onset on 7/28/2017 at 51 years.  Comments:  8/18/2017 CDT 11:14 CDT - Andreia Romero LPN.  tissue from right lower leg  Granulation tissue (019847682): Onset on 7/28/2017 at 51 years.  Comments:  8/18/2017 CDT 11:15 CDBELINDA - Andreia Romero LPN.  inflamed granulation tissue from right lower leg  Hyperlipemia ([id number])  Hypertension (14147787)  Anemia (619233353)  CAD - Coronary artery disease (5793802888)  Chronic radicular low back pain (917156056)  Axillary lymphadenopathy (999935)  Pelvic lymphadenopathy (439842)  Inguinal lymphadenopathy (031697)  Resolved  Polyp of colon (183387135):  Resolved.  Hemorrhoids (036741208):  Resolved.  Tobacco user (736945977):  Resolved.   Social History        Social & Psychosocial Habits    Alcohol  10/11/2017  Use: Current    Type: Beer    Frequency: 3-5 times per week    Average drinks per episode in last year: 6    Employment/School  06/07/2016  Status: disabled    Exercise    Comment: as tolerated - 06/07/2016 11:02 - Dali Jimenez LPN    Home/Environment  06/07/2016  Living situation: Home/Independent    Home equipment: Respiratory treatments    Alcohol abuse in household: No    Substance abuse in household: No    Smoker in household: No    Injuries/Abuse/Neglect in household: No    Feels unsafe at home: No    Safe place to go: Yes    Family/Friends available to help: Yes    Concern for family members at home: No    Nutrition/Health  04/29/2016  Caffeine intake amount: 2 servings per day    Sexual  06/07/2016  Sexually active:  Yes    Substance Abuse  04/29/2016  Use: Never    Tobacco  10/11/2017  Use: Current every day smoker    Tobacco use per day: 1.5    Total pack years: 38  .        Physical Examination   General:  Alert and oriented, No acute distress.    Eye:  Pupils are equal, round and reactive to light, Extraocular movements are intact, Normal conjunctiva.    HENT:  Normocephalic, Normal hearing, Oral mucosa is moist, No pharyngeal erythema.    Neck:  Supple, No carotid bruit, No jugular venous distention, No lymphadenopathy, No thyromegaly.    Respiratory:  Lungs are clear to auscultation, Breath sounds are equal.    Cardiovascular:  Normal rate, Regular rhythm, No murmur, Good pulses equal in all extremities, Normal peripheral perfusion, No edema.    Gastrointestinal:  Soft, Non-tender, Non-distended, Normal bowel sounds.    Genitourinary:  No costovertebral angle tenderness.    Vital Signs   10/25/2017 7:00 CDT      Temperature Oral          36.7 DegC                             Heart Rate Monitored      64 bpm                             Respiratory Rate          20 br/min                             SpO2                      95 %                             Systolic Blood Pressure   133 mmHg                             Diastolic Blood Pressure  81 mmHg    10/25/2017 3:00 CDT      Temperature Oral          37 DegC                             Heart Rate Monitored      75 bpm                             Respiratory Rate          22 br/min                             SpO2                      97 %                             Systolic Blood Pressure   135 mmHg                             Diastolic Blood Pressure  69 mmHg    10/24/2017 23:00 CDT     Temperature Oral          36.9 DegC                             Heart Rate Monitored      72 bpm                             Respiratory Rate          20 br/min                             SpO2                      98 %                             Systolic Blood Pressure   143 mmHg  HI                              Diastolic Blood Pressure  65 mmHg    10/24/2017 19:00 CDT     Temperature Oral          37 DegC                             Heart Rate Monitored      69 bpm                             Respiratory Rate          20 br/min                             SpO2                      97 %                             Systolic Blood Pressure   139 mmHg                             Diastolic Blood Pressure  69 mmHg    10/24/2017 15:00 CDT     Temperature Oral          36.6 DegC                             Heart Rate Monitored      70 bpm                             Respiratory Rate          18 br/min                             SpO2                      96 %                             Systolic Blood Pressure   136 mmHg                             Diastolic Blood Pressure  78 mmHg    10/24/2017 11:00 CDT     Temperature Oral          36.5 DegC                             Heart Rate Monitored      64 bpm                             Respiratory Rate          18 br/min                             SpO2                      96 %                             Systolic Blood Pressure   139 mmHg                             Diastolic Blood Pressure  83 mmHg    10/24/2017 8:00 CDT      SpO2                      95 %                             Oxygen Therapy            Room air    10/24/2017 7:00 CDT      Temperature Oral          36.5 DegC                             Heart Rate Monitored      69 bpm                             Respiratory Rate          18 br/min                             SpO2                      95 %                             Systolic Blood Pressure   154 mmHg  HI                             Diastolic Blood Pressure  86 mmHg    10/24/2017 6:59 CDT      24 HR Intake Totals       590 mL                             24 HR Output Totals       850 mL    10/24/2017 3:00 CDT      Temperature Oral          36.9 DegC                             Heart Rate Monitored      72 bpm                              Respiratory Rate          18 br/min                             SpO2                      97 %                             Systolic Blood Pressure   135 mmHg                             Diastolic Blood Pressure  65 mmHg        Vital Signs (last 24 hrs)_____  Last Charted___________  Temp Oral     36.7 DegC  (OCT 25 07:00)  Resp Rate         20 br/min  (OCT 25 07:00)  SBP      133 mmHg  (OCT 25 07:00)  DBP      81 mmHg  (OCT 25 07:00)  SpO2      95 %  (OCT 25 07:00)     Lymphatics:  No lymphadenopathy neck, axilla, groin.    Musculoskeletal:  Normal range of motion, Normal strength, No tenderness, No swelling, No deformity.    Neurologic:  Alert, Oriented, Normal sensory, Normal motor function, No focal deficits, Cranial Nerves II-XII are grossly intact, Normal deep tendon reflexes.    Psychiatric:  Cooperative, Appropriate mood & affect, Normal judgment.    Integumentary:  Warm, Dry, Intact.         Integumentary exam: right anterior groing with erythema from fold to mid thigh, + drainage, serousanga, wound vac intact   .    R shin region with macerated  central region      Health Maintenance      Health Maintenance     Pending (in the next year)        Due            Alcohol Misuse Screening due  10/25/17  and every 1  year(s)           Coronary Artery Disease Maintenance-Antiplatelet Agent Prescribed due  10/25/17  and every 1  year(s)           Hypertension Management-Education due  10/25/17  and every 1  year(s)           Tetanus Vaccine due  10/25/17  and every 10  year(s)        Due In Future            Hypertension Management-Blood Pressure not due until  04/25/18  and every 6  month(s)           Aspirin Therapy for CVD Prevention not due until  08/02/18  and every 1  year(s)           Influenza Vaccine not due until  10/02/18  and every 1  year(s)           Coronary Artery Disease Maintenance-Electrocardiogram not due until  10/13/18  and every 1  year(s)           Body Mass Index Check not due until   10/23/18  and every 1  year(s)           Depression Screening not due until  10/23/18  and every 1  year(s)           Obesity Screening not due until  10/23/18  and every 1  year(s)           Smoking Cessation not due until  10/23/18  and every 1  year(s)           Tobacco Use Screening not due until  10/23/18  and every 1  year(s)           Coronary Artery Disease Maintenance-Lipid Lowering Therapy not due until  10/24/18  and every 1  year(s)     Satisfied (in the past 1 year)        Satisfied            Aspirin Therapy for CVD Prevention on  08/02/17.  Satisfied by Luanne Edwards           Blood Pressure Screening on  10/25/17.  Satisfied by Pierre Carpenter           Body Mass Index Check on  10/23/17.  Satisfied by Andreia Romero LPN           Coronary Artery Disease Maintenance-BMP on  10/25/17.  Satisfied by Diaz Saldivar           Depression Screening on  10/23/17.  Satisfied by Andreia Romero LPN           Diabetes Screening on  10/25/17.  Satisfied by Diaz Saldivar           Hypertension Management-BMP on  10/25/17.  Satisfied by Diaz Saldivar           Hypertension Management-Blood Pressure on  10/25/17.  Satisfied by Pierre Carpenter           Influenza Vaccine on  10/13/17.  Satisfied by Elissa Machuca RN           Lipid Screening on  09/08/17.  Satisfied by Radha Giles           Obesity Screening on  10/23/17.  Satisfied by Andreia Romero LPN           Smoking Cessation on  10/23/17.  Satisfied by Radha Puckett RN           Tobacco Use Screening on  10/23/17.  Satisfied by Radha Puckett RN          Review / Management   Results review:  Lab results   10/25/2017 5:07 CDT      Sodium Lvl                140 mmol/L                             Potassium Lvl             4.0 mmol/L                             Chloride                  103 mmol/L                             CO2                       30 mmol/L                             Calcium Lvl                9.1 mg/dL                             Magnesium Lvl             2.4 mg/dL                             Glucose Lvl               108 mg/dL                             BUN                       7 mg/dL  LOW                             Creatinine                0.74 mg/dL                             eGFR-AA                   >60 mL/min/1.73 m2  NA                             eGFR-MICHELLE                  >60 mL/min/1.73 m2  NA                             Bili Total                0.5 mg/dL                             Bili Direct               0.10 mg/dL                             Bili Indirect             0.40 mg/dL  NA                             AST                       16 unit/L                             ALT                       21 unit/L                             Alk Phos                  87 unit/L                             Total Protein             7.7 gm/dL                             Albumin Lvl               3.3 gm/dL  LOW                             Globulin                  4.40 gm/dL  NA                             A/G Ratio                 0.8 ratio  NA                             Phosphorus                3.9 mg/dL                             WBC                       10.3 x10(3)/mcL                             RBC                       4.76 x10(6)/mcL                             Hgb                       14.4 gm/dL                             Hct                       44.6 %                             Platelet                  429 x10(3)/mcL  HI                             MCV                       94 fL                             MCH                       30 pg                             MCHC                      32 gm/dL                             RDW                       14.4 %                             MPV                       9.5 fL                             Abs Neut                  7.0 x10(3)/mcL  HI                             Neutro Auto               68 %                              Lymph Auto                20.20 %                             Mono Auto                 8 %                             Eos Auto                  3 %                             Abs Eos                   0.3 x10(3)/mcL                             Basophil Auto             0 %                             Abs Neutro                7.0 x10(3)/mcL  HI                             Abs Lymph                 2.1 x10(3)/mcL                             Abs Mono                  0.8 x10(3)/mcL                             Abs Baso                  0.0 x10(3)/mcL    10/24/2017 7:25 CDT      Sodium Lvl                138 mmol/L                             Potassium Lvl             4.0 mmol/L                             Chloride                  100 mmol/L                             CO2                       31 mmol/L                             Calcium Lvl               8.8 mg/dL                             Glucose Lvl               107 mg/dL                             BUN                       5 mg/dL  LOW                             Creatinine                0.86 mg/dL                             eGFR-AA                   >60 mL/min/1.73 m2  NA                             eGFR-MICHELLE                  >60 mL/min/1.73 m2  NA                             WBC                       11.3 x10(3)/mcL                             RBC                       4.65 x10(6)/mcL  LOW                             Hgb                       14.2 gm/dL                             Hct                       43.3 %                             Platelet                  414 x10(3)/mcL  HI                             MCV                       93 fL                             MCH                       30 pg                             MCHC                      33 gm/dL                             RDW                       14.4 %                             MPV                       9.3 fL                             Abs Neut                   7.5 x10(3)/mcL  HI                             Neutro Auto               66 %                             Lymph Auto                22.40 %                             Mono Auto                 8 %                             Eos Auto                  2 %                             Abs Eos                   0.3 x10(3)/mcL                             Basophil Auto             0 %                             Abs Neutro                7.5 x10(3)/mcL  HI                             Abs Lymph                 2.5 x10(3)/mcL                             Abs Mono                  0.9 x10(3)/mcL                             Abs Baso                  0.1 x10(3)/mcL  .       Impression and Plan   Diagnosis     SCC (squamous cell carcinoma), leg (GLW19-KX C44.721).     LA (lymphadenopathy) (PTG31-VN R59.1).     Pelvic lymphadenopathy (MLK35-KO R59.0).     CAD - Coronary artery disease (QPB10-KB I25.10).     Anemia (RFR35-TV D64.9).     Hyperlipemia (XFH26-ZB E78.5).     Cellulitis (KVV69-ZH L03.90).     Course:  Improving.    Plan:  continue current treatment modalities. will continue to follow while inpatient.      .    Patient Instructions:       Counseled: Patient, Regarding medications, Activity.

## 2022-04-30 NOTE — OP NOTE
Patient:   Deny Romero             MRN: [id number]            FIN: [id number]               Age:   52 years     Sex:  Male     :  1965   Associated Diagnoses:   None   Author:   Dmitry Marquez MD      Date of procedure: 2017    Preoperative diagnosis: Right tonsil fossa lesion of unknown etiology.  History of tobacco use.    Postoperative diagnosis: Same.    Procedure: Direct diagnostic laryngoscopy and biopsy of the right tonsil fossa.    Surgeon: Dmitry Marquez M.D.    Findings: The patient had a 1 cm area of palpable induration within the mid to upper aspect of the right tonsil fossa without any apparent mucosal ulceration.  The remainder of the oral pharynx, hypopharynx, and larynx were unremarkable.  The patient had no palpable cervical masses or adenopathy.    Specimens: Biopsy from the right tonsil fossa.    Estimated blood loss: Less than 10 mL.    Complications: None.    Drains: None.    Anesthesia: Gen. endotracheal anesthesia    Indications: Please see history and physical exam    Procedure: Patient was brought to the operating room and placed on the operating room table in supine position after which general endotracheal anesthesia was induced.  The patient was then prepped and draped in sterile fashion.  A mouth piece was placed over the upper alveolar ridge for protection.  The operating laryngoscope was then inserted into the oral cavity and advanced through the oropharynx down into the hypopharynx.  Examination of the hypopharynx including the posterior pharyngeal wall and the right and left piriform sinuses was unremarkable for any mucosal disease.  Larynx was examined.  Examination of the epiglottis, the arytenoids, right and left false vocal cords and the right and left true vocal cords as well as the aryepiglottic folds was unremarkable for any mucosal masses or lesions.  Patient did have some mild diffuse erythema.  The subglottis was also without any  apparent lesions.  Examination of the postcricoid area showed no mucosal lesions.  Examination the oropharynx was carried out.  Examination of the base of tongue and vallecula were unremarkable for any apparent lesions.  Examination of the posterior pharyngeal wall and soft palate are unremarkable for any masses or lesions.  Both tonsillar fossa were examined and the patient did not have any gross mucosal lesions.  Palpation of the tonsillar fossa was then carried out and there was an area of palpable induration in the mid to upper portion of the right tonsil fossa in this area measured approximately 1 cm in diameter.  There were no such palpable areas in the left tonsil fossa.  Multiple biopsies were then obtained from the right tonsil fossa in the area of induration.  Patient had minimal bleeding.  Examination of the floor mouth and tongue as well as the hard palate and buccal mucosa were unremarkable for lesions.  Palpation of the neck while the patient was then anesthesia was unremarkable for masses or palpable adenopathy.  Trachea was in the midline.  At this point laryngoscopy and biopsy of the oropharynx was completed.  Dr. Burnham then proceeded with his portion of the procedure.  Further plans will be made pending the patient's pathology report.    CC: Dr. Lanette Plaza and Dr. Jesus Burnham

## 2022-04-30 NOTE — CONSULTS
DATE OF CONSULTATION:  10/24/17    CONSULTING PHYSICIAN:  Yariel Ferrari MD    CHIEF COMPLAINT:  Mr. Romero is known to Wound Care. He recently (within the last week)  had a biopsy for lymph nodes in the right groin.  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  This was for investigation of possible lymph node spread.  Following that procedure, his upper right thigh has become reddened, irritated, and draining.  Dr. Burnham has seen the wound, had him admitted to the hospital, and started him on IV antibiotics.  He has also applied a wound VAC, which is pulling a fair amount of drainage from the wound.  The VAC is working properly and is sealed so that the suction is removing drainage material.  This will hopefully be continued once he is discharged from the hospital, and we will change the wound VAC dressing and reapply on Thursday during his regular wound care appointment.     He also has the original wound on the pretibial area on the right lower extremity.  This wound is clean and closing nicely.    EXAMINATION:  SKIN:  This morning, there is some slough in the wound on the tibial area. This was removed.    PLAN:  We have redressed it with Maxorb Ag and a Triact border.  That too can remain in place until his regular Thursday morning appointment with wound care, at which time we will clean it again and reapply the dressing.     We appreciate the opportunity to see the patient while he is in the hospital and follow with his care.     Thank you for the consult.      PILO   DD: 10/24/2017 0842   DT: 10/24/2017 0858  Job # [id number]

## 2022-04-30 NOTE — OP NOTE
Patient:   Deny Romero             MRN: [id number]            FIN: [id number]               Age:   52 years     Sex:  Male     :  1965   Associated Diagnoses:   SCC (squamous cell carcinoma), leg   Author:   Dilma Burnham MD      Operative Note   Operative Information   Date/ Time:  10/13/2017 20:04:00.     Procedures Performed: Procedure Code   Biopsy or excision of lymph node(s); open, superficial (57492).  Intraoperative identification (eg, mapping) of sentinel lymph node(s) includes injection of non-radioactive dye, when performed (List separately in addition to code for primary procedure) (21884)..     Indications: 52-year-old white male with squamous cell carcinoma of the right lower extremity in need of sentinel lymph node biopsy for further diagnostic and staging.     Preoperative Diagnosis: SCC (squamous cell carcinoma), leg ([id number]-SF C44.721).     Postoperative Diagnosis: SCC (squamous cell carcinoma), leg ([id number] C44.721).     Surgeon: Dilma Burnham MD.     Anesthesia: Gen..     Speciman Removed: right inguinal lymph nodes.     Description of Procedure/Findings/    Complications: procedure in detail:  Prior to entering the operative theater patient was brought to radiology in the right lower extremity was injected with sulfur colloid. Upon entering the operating theater the right lower extremity was then also injected with methylene blue approximately 1/2 mL. The right lower extremity right groin and inguinal regions was then sterilely prepped and draped in normal surgical fashion using chlorhexidine. Using the McRoberts counter the active region within the right groin was then identified and demarcated using a pen. Preoperative antibiotics were then administered. All resolve incision was made within the groin crease with dissection down to the subcutaneous tissues using a #15 blade. McRoberts counter was used to guide dissection directly overlying 5 matted lymph nodes within the  right inguinal region. Circumferential dissection was carried out transection as well as ligation was performed using 3-0 Vicryl. The specimens were then sent in to mediums. One half sent in formalin for permanent section. Second-half set and flow cytometry medium for evaluation. The skin and subcutaneous tissues were then reapproximated in a layered fashion using 3-0 Vicryl and running 4-0 subcuticular Monocryl. Sterile dressing was then placed over the wound the patient was relieved anesthesia in a stable condition and transferred to postanesthesia care unit..     Esimated blood loss: loss  5  cc.     Complications: None.